# Patient Record
Sex: FEMALE | Race: BLACK OR AFRICAN AMERICAN | Employment: OTHER | URBAN - METROPOLITAN AREA
[De-identification: names, ages, dates, MRNs, and addresses within clinical notes are randomized per-mention and may not be internally consistent; named-entity substitution may affect disease eponyms.]

---

## 2018-03-14 ENCOUNTER — HOSPITAL ENCOUNTER (OUTPATIENT)
Dept: PHYSICAL THERAPY | Age: 63
Discharge: HOME | End: 2018-03-14
Payer: COMMERCIAL

## 2018-03-14 PROCEDURE — 97110 THERAPEUTIC EXERCISES: CPT | Performed by: PHYSICAL THERAPIST

## 2018-03-14 PROCEDURE — 97161 PT EVAL LOW COMPLEX 20 MIN: CPT | Performed by: PHYSICAL THERAPIST

## 2018-03-14 NOTE — PROGRESS NOTES
[facility]  [address] [place], [address] [place]  Ph [phone]  Fx [phone]    Plan of Care/ Statement of Necessity for Physical Therapy Services    Patient name: Leary Schirmer Start of Care: 3/14/2018   Referral source: Jude Lovelace MD : 1955    Medical Diagnosis: left ankle/foot traumatic arthropathy   Onset Date:2017    Treatment Diagnosis: L ankle replacement   Prior Hospitalization: see medical history Provider#: [id number]   Medications: Verified on Patient summary List    Comorbidities: OA, HTN   Prior Level of Function: sedentary, independent w/ADLs      The Plan of Care and following information is based on the information from the initial evaluation. Assessment/ key information: Patient presents s/p L Total ankle arthroplasty on 2017. Patient will continue to benefit from skilled PT services to modify and progress therapeutic interventions, address functional mobility deficits, address ROM deficits, address strength deficits, analyze and address soft tissue restrictions, analyze and cue movement patterns, analyze and modify body mechanics/ergonomics and assess and modify postural abnormalities to attain remaining goals.   Evaluation Complexity History MEDIUM  Complexity : 1-2 comorbidities / personal factors will impact the outcome/ POC ; Examination MEDIUM Complexity : 3 Standardized tests and measures addressing body structure, function, activity limitation and / or participation in recreation  ;Presentation LOW Complexity : Stable, uncomplicated  ;Clinical Decision Making MEDIUM Complexity : FOTO score of 26-74  Overall Complexity Rating: LOW   Problem List: pain affecting function, decrease ROM, decrease strength, edema affecting function, impaired gait/ balance, decrease ADL/ functional abilitiies, decrease activity tolerance, decrease flexibility/ joint mobility and decrease transfer abilities   Treatment Plan may include any combination of the following: Therapeutic exercise, Therapeutic activities, Neuromuscular re-education, Physical agent/modality, Gait/balance training, Manual therapy, Aquatic therapy, Patient education, Functional mobility training and Stair training  Patient / Family readiness to learn indicated by: asking questions, trying to perform skills and interest  Persons(s) to be included in education: patient (P)  Barriers to Learning/Limitations: None  Patient Goal (s): build up strength  Patient Self Reported Health Status: good  Rehabilitation Potential: fair    Short Term Goals: To be accomplished in 2 weeks:   Patient will report compliance with HEP at least 1x/day to aid in rehabilitation program.   Status at IE:na   Current:     Patient will demonstrate AROM of 10 degrees DF to aid in completion of ADLs. Status at IE:0 L DF   Current:       Long Term Goals: To be accomplished in 4 weeks:   Patient will increase strength to 4+/5 throughout B LEs to aid in return recreational activities and ADLs. Status at IE:4-/5 grossly throughout L ankle   Current:     Patient will ambulate 20mins on level surface with no need for rest break and no ankle pain. Status at IE: requires rest breaks after about 10mins   Current:     Patient will improve FOTO to 59 points overall to demonstrate improvement in functional ability. Status at IE: 40   Current:         Frequency / Duration: Patient to be seen 2 times per week for 4 weeks. Patient/ Caregiver education and instruction: Diagnosis, prognosis, self care, activity modification and exercises   [x]  Plan of care has been reviewed with PEREZ Clements PT, DPT 3/14/2018 3:50 PM    ________________________________________________________________________    I certify that the above Therapy Services are being furnished while the patient is under my care. I agree with the treatment plan and certify that this therapy is necessary.     Physician's Signature:____________________ Date:____________Time: _________    Please sign and return to [facility]  [address], [address]  Ph [phone]  Fx [phone]

## 2018-03-14 NOTE — PROGRESS NOTES
PT DAILY TREATMENT NOTE/FOOT AND ANKLE EVAL 3-16    Patient Name: Vince Pelletier  Date:3/14/2018  : 1955  [x]  Patient  Verified  Payor: Roberto Medina / Plan: [place] [employer] [place] / Product Type: PPO /    In time:3:00  Out time:3:50  Total Treatment Time (min): 50  Total Timed Codes (min): 25  1:1 Treatment Time ( [address] only): 50   Visit #: 1 of 8    Treatment Area: left ankle/foot traumatic arthropathy    SUBJECTIVE  Pain Level (0-10 scale): 1  []constant []intermittent []improving []worsening []no change since onset    Any medication changes, allergies to medications, adverse drug reactions, diagnosis change, or new procedure performed?: [x] No    [] Yes (see summary sheet for update)  Subjective functional status/changes:     Patient has CC of L TAA on 2018. MARQUITA: None. Patient describes pain as spasm, occasionally. No diurnal features to pain. . Denies numbness/tingling (reports L dorsalmedial foot numbness)  Denies popping/clicking. Aggravating factors:walking. Alleviating factors: rest.  Denies red flags: SOB, chest pain, dizziness/lightheadedness, blurred/double vision, HA, chills/fevers, night sweats, change in bowel/bladder control, abdominal pain, difficulty swallowing, slurred speech, unexplained weight gain/loss. PMHx: HTN, OA. Surgical Hx: L total ankle replacement 2017. Social Hx: 1 level home (5 KRIS), denies alcohol, tobacco, work status: telemetry. PLOF:  sedentary. CLOF: similar.   Diagnostic Imaging: NA      OBJECTIVE/EXAMINATION    25 min [x]Eval                  []Re-Eval       25 min Therapeutic Exercise:  [x] See flow sheet :   Rationale: increase ROM, increase strength and improve coordination to improve the patients ability to complete ADLs       With   [] TE   [] TA   [] neuro   [] other: Patient Education: [x] Review HEP    [] Progressed/Changed HEP based on:   [] positioning   [] body mechanics   [] transfers   [] heat/ice application    [] other:      Physical Therapy Evaluation  - Foot and Ankle    Gait: [] Normal    [] Abnormal    [x] Antalgic    [] NWB    Device:  Describe:    ROM/Strength  [] Unable to assess at this time      AROM           Strength (1-5)   Left Right Left  Right   Dorsiflexion 0 10 4- 5   Plantarflexion 30 45 2+ 3+   Inversion 20 35 4- 4-   Eversion 5 10 4- 4-   Great Toe Ext 55 70 5 5       Optional Tests:  Single Leg Balance:   (L) Time(sec):  <5 (R) Time(sec): 15 (L)/(R)%:      Other tests/ comments: deferred       Pain Level (0-10 scale) post treatment: 1    ASSESSMENT/Changes in Function: Patient presents s/p L Total ankle arthroplasty on 11/29/2017. Patient will continue to benefit from skilled PT services to modify and progress therapeutic interventions, address functional mobility deficits, address ROM deficits, address strength deficits, analyze and address soft tissue restrictions, analyze and cue movement patterns, analyze and modify body mechanics/ergonomics and assess and modify postural abnormalities to attain remaining goals.      [x]  See Plan of Care  []  See progress note/recertification  []  See Discharge Summary         Progress towards goals / Updated goals:  See POC    PLAN  []  Upgrade activities as tolerated     [x]  Continue plan of care  []  Update interventions per flow sheet       []  Discharge due to:_  []  Other:_      Sean Miller PT, DPT 3/14/2018  3:08 PM

## 2018-03-16 ENCOUNTER — HOSPITAL ENCOUNTER (OUTPATIENT)
Dept: PHYSICAL THERAPY | Age: 63
Discharge: HOME | End: 2018-03-16
Payer: COMMERCIAL

## 2018-03-16 PROCEDURE — 97110 THERAPEUTIC EXERCISES: CPT

## 2018-03-16 PROCEDURE — 97530 THERAPEUTIC ACTIVITIES: CPT

## 2018-03-16 NOTE — PROGRESS NOTES
PT DAILY TREATMENT NOTE     Patient Name: Josefa Prabhakar  Date:3/16/2018  : 1955  [x]  Patient  Verified  Payor: Cyndee Cabrera / Plan: [place] [employer] [place] / Product Type: PPO /    In time:3:35  Out time:4:20  Total Treatment Time (min): 45  Visit #: 2 of 8    Treatment Area: left ankle/foot traumatic arthropathy    SUBJECTIVE  Pain Level (0-10 scale): 0/10  Any medication changes, allergies to medications, adverse drug reactions, diagnosis change, or new procedure performed?: [x] No    [] Yes (see summary sheet for update)  Subjective functional status/changes:   [] No changes reported  \"I don't have any pain right now. I have a lot of stiffness and some pain in the morning but it feels better when I move it. \"    OBJECTIVE      30 min Therapeutic Exercise:  [x] See flow sheet :   Rationale: increase ROM, increase strength and improve coordination to improve the patients ability to perform ADLs with less pain. 15 min Therapeutic Activity:  [x]  See flow sheet :   Rationale: increase ROM, increase strength and improve coordination  to improve the patients ability to perform ADLs with less pain. With   [] TE   [] TA   [] neuro   [] other: Patient Education: [x] Review HEP    [] Progressed/Changed HEP based on:   [] positioning   [] body mechanics   [] transfers   [] heat/ice application    [] other:      Other Objective/Functional Measures:      Pain Level (0-10 scale) post treatment: 0/10    ASSESSMENT/Changes in Function: Pt tolerated therex well. Pt required VC and demonstration for correct squat form and to discourage forward weight shift during. Pt denied modalities post tx.      Patient will continue to benefit from skilled PT services to modify and progress therapeutic interventions, address functional mobility deficits, address ROM deficits, address strength deficits, analyze and address soft tissue restrictions, analyze and cue movement patterns, analyze and modify body mechanics/ergonomics and assess and modify postural abnormalities to attain remaining goals. []  See Plan of Care  []  See progress note/recertification  []  See Discharge Summary         Progress towards goals / Updated goals:  Short Term Goals: To be accomplished in 2 weeks:                        Patient will report compliance with HEP at least 1x/day to aid in rehabilitation program.                        Status at IE:na                        Current: NT                           Patient will demonstrate AROM of 10 degrees DF to aid in completion of ADLs. Status at IE:0 L DF                        Current: NT         Long Term Goals: To be accomplished in 4 weeks:                        Patient will increase strength to 4+/5 throughout B LEs to aid in return recreational activities and ADLs. Status at IE:4-/5 grossly throughout L ankle                        Current: NT                           Patient will ambulate 20mins on level surface with no need for rest break and no ankle pain. Status at IE: requires rest breaks after about 10mins                        Current: NT                           Patient will improve FOTO to 59 points overall to demonstrate improvement in functional ability.                         Status at IE: 40                        Current: NT           PLAN  [x]  Upgrade activities as tolerated     [x]  Continue plan of care  []  Update interventions per flow sheet       []  Discharge due to:_  []  Other:_      Reta Connell 3/16/2018  4:29 PM    Future Appointments  Date Time Provider Deidra Ortiz   3/19/2018 9:00 [address] [facility]   3/21/2018 2:30 PM Willy Tellez PT [facility]   3/26/2018 9:00 AM [facility]note 3[facility]   3/28/2018 9:00 AM [facility][note 3]   4/2/2018 10:00 AM [facility][note 3]   4/4/2018 10:00 AM [facility][note 3]   4/9/2018 10:00 AM [facility][note 3] 4/11/2018 10:00  Adelso Martínez

## 2018-03-19 ENCOUNTER — HOSPITAL ENCOUNTER (OUTPATIENT)
Dept: PHYSICAL THERAPY | Age: 63
Discharge: HOME | End: 2018-03-19
Payer: COMMERCIAL

## 2018-03-19 PROCEDURE — 97530 THERAPEUTIC ACTIVITIES: CPT

## 2018-03-19 PROCEDURE — 97110 THERAPEUTIC EXERCISES: CPT

## 2018-03-19 NOTE — PROGRESS NOTES
PT DAILY TREATMENT NOTE     Patient Name: Josefa Prabhakar  Date:3/19/2018  : 1955  [x]  Patient  Verified  Payor: Cyndee Cbarera / Plan: [place] [employer] / Product Type: PPO /    In time:9:05  Out time:9:45  Total Treatment Time (min): 40  Visit #: 3 of 8    Treatment Area: left ankle/foot traumatic arthropathy    SUBJECTIVE  Pain Level (0-10 scale): 0/10  Any medication changes, allergies to medications, adverse drug reactions, diagnosis change, or new procedure performed?: [x] No    [] Yes (see summary sheet for update)  Subjective functional status/changes:   [] No changes reported  \"I don't really have any pain today. I had some spasms after the last tx but nothing bad. \"    OBJECTIVE      30 min Therapeutic Exercise:  [x] See flow sheet :   Rationale: increase ROM, increase strength and improve coordination to improve the patients ability to perform ADLs with less pain. 10 min Therapeutic Activity:  [x]  See flow sheet :   Rationale: increase ROM, increase strength and improve coordination  to improve the patients ability to perform ADLs with less pain. With   [] TE   [] TA   [] neuro   [] other: Patient Education: [x] Review HEP    [] Progressed/Changed HEP based on:   [] positioning   [] body mechanics   [] transfers   [] heat/ice application    [] other:      Other Objective/Functional Measures:      Pain Level (0-10 scale) post treatment: 0/10    ASSESSMENT/Changes in Function: Pt tolerated therex well. Pt requires manual cues for ankle mobility with therex. Pt tends to compensate ankle movements with hip strategies. Pt denied modalities post tx.      Patient will continue to benefit from skilled PT services to modify and progress therapeutic interventions, address functional mobility deficits, address ROM deficits, address strength deficits, analyze and address soft tissue restrictions, analyze and cue movement patterns, analyze and modify body mechanics/ergonomics and assess and modify postural abnormalities to attain remaining goals. []  See Plan of Care  []  See progress note/recertification  []  See Discharge Summary         Progress towards goals / Updated goals:  Short Term Goals: To be accomplished in 2 weeks:                        ZGMYMJB will report compliance with HEP at least 1x/day to aid in rehabilitation program.                        FGKJJT at IE:na                        Current: Pt reports more amb than therex.                            Patient will demonstrate AROM of 10 degrees DF to aid in completion of ADLs.                       SJZXVW at IE:0 L DF                        Current: NT          Long Term Goals: To be accomplished in 4 weeks:                        UIOBBUY will increase strength to 4+/5 throughout B LEs to aid in return recreational activities and ADLs.                       IKIHWM at IE:4-/5 grossly throughout L ankle                        Current: NT                            Patient will ambulate 20mins on level surface with no need for rest break and no ankle pain.                        Status at IE: requires rest breaks after about 10mins                        Current: NT                            Patient will improve FOTO to 59 points overall to demonstrate improvement in functional ability.                         STOLBL at IE: 40                        Current: NT        PLAN  [x]  Upgrade activities as tolerated     [x]  Continue plan of care  []  Update interventions per flow sheet       []  Discharge due to:_  []  Other:_      Merle Venegas 3/19/2018  9:11 AM    Future Appointments  Date Time Provider Deidra Ortiz   3/21/2018 2:30 PM Raghu Davila PT [facility]   3/26/2018 9:00  [address] [facility]   3/28/2018 9:00 AM [facility]   4/2/2018 10:00 AM Merle Waggonerirn [facility]   [facility]2018 [facility]   [facility]2018 [facility]   4/11/2018 10:00 AM Merle Venegas [facility]

## 2018-03-21 ENCOUNTER — HOSPITAL ENCOUNTER (OUTPATIENT)
Dept: PHYSICAL THERAPY | Age: 63
Discharge: HOME | End: 2018-03-21
Payer: COMMERCIAL

## 2018-03-21 PROCEDURE — 97530 THERAPEUTIC ACTIVITIES: CPT

## 2018-03-21 PROCEDURE — 97110 THERAPEUTIC EXERCISES: CPT

## 2018-03-21 NOTE — PROGRESS NOTES
PT DAILY TREATMENT NOTE     Patient Name: Sarahann Riedel  Date:3/21/2018   : 1955  [x]  Patient  Verified  Payor: Shade Prabhakar / Plan: [place] [employer] [place] / Product Type: PPO /    In time:2:30  Out time:3:30  Total Treatment Time (min): 60  Visit #: 4 of 8    Treatment Area: left ankle/foot traumatic arthropathy    SUBJECTIVE  Pain Level (0-10 scale): 1/10  Any medication changes, allergies to medications, adverse drug reactions, diagnosis change, or new procedure performed?: [x] No    [] Yes (see summary sheet for update)  Subjective functional status/changes:   [] No changes reported  \"It's a little stiff today. \"    OBJECTIVE      40 min Therapeutic Exercise:  [x] See flow sheet :   Rationale: increase ROM, increase strength and improve coordination to improve the patients ability to perform ADLs with less pain. 20 min Therapeutic Activity:  [x]  See flow sheet :   Rationale: increase ROM, increase strength and improve coordination  to improve the patients ability to perform ADLs with less pain. With   [] TE   [] TA   [] neuro   [] other: Patient Education: [x] Review HEP    [] Progressed/Changed HEP based on:   [] positioning   [] body mechanics   [] transfers   [] heat/ice application    [] other:      Other Objective/Functional Measures:      Pain Level (0-10 scale) post treatment: 0/10    ASSESSMENT/Changes in Function: Pt continues to be challenged by therex. Pt reports stiffness in anterior ankle. Pt denied modalities post tx. Patient will continue to benefit from skilled PT services to modify and progress therapeutic interventions, address functional mobility deficits, address ROM deficits, address strength deficits, analyze and address soft tissue restrictions, analyze and cue movement patterns, analyze and modify body mechanics/ergonomics and assess and modify postural abnormalities to attain remaining goals.      []  See Plan of Care  []  See progress note/recertification  []  See Discharge Summary         Progress towards goals / Updated goals:  Short Term Goals: To be accomplished in 2 weeks:                        SMLKSJH will report compliance with HEP at least 1x/day to aid in rehabilitation program.                        ILDSFS at IE:na                        Current: Pt reports compliance 2x/per day                            Patient will demonstrate AROM of 10 degrees DF to aid in completion of ADLs.                       OOBQCM at IE:0 L DF                        Current: DF: 0          Long Term Goals: To be accomplished in 4 weeks:                        CGCFQEQ will increase strength to 4+/5 throughout B LEs to aid in return recreational activities and ADLs.                       IDVUUU at IE:4-/5 grossly throughout L ankle                        Current: NT                            Patient will ambulate 20mins on level surface with no need for rest break and no ankle pain.                        Status at IE: requires rest breaks after about 10mins                        Current: NT                            Patient will improve FOTO to 59 points overall to demonstrate improvement in functional ability.                         HEOMWU at IE: 40                        Current: NT    PLAN  [x]  Upgrade activities as tolerated     [x]  Continue plan of care  []  Update interventions per flow sheet       []  Discharge due to:_  []  Other:_      [facility] 3/21/2018  2:39 PM    Future Appointments  Date Time Provider Deidra Ortiz   3/26/2018 9:00 [address] [facility]   3/28/2018 9:00  [address] [facility]   4/2/2018 10:00 AM [facility]   4/4/2018 10:00 AM [facility]   4/9/2018 10:00 AM [facility]   4/11/2018 10:00 AM [facility]

## 2018-03-26 ENCOUNTER — HOSPITAL ENCOUNTER (OUTPATIENT)
Dept: PHYSICAL THERAPY | Age: 63
Discharge: HOME | End: 2018-03-26
Payer: COMMERCIAL

## 2018-03-26 PROCEDURE — 97530 THERAPEUTIC ACTIVITIES: CPT

## 2018-03-26 PROCEDURE — 97110 THERAPEUTIC EXERCISES: CPT

## 2018-03-26 NOTE — PROGRESS NOTES
PT DAILY TREATMENT NOTE     Patient Name: Doe Ashley  Date:3/26/2018  : 1955  [x]  Patient  Verified  Payor: William Blankenship / Plan: [place] [employer] PPO / Product Type: PPO /    In time:1:30  Out time:2:15  Total Treatment Time (min): 45  Visit #: 5 of 8    Treatment Area: Traumatic arthropathy, left ankle and foot [M12.572]    SUBJECTIVE  Pain Level (0-10 scale): 0/10  Any medication changes, allergies to medications, adverse drug reactions, diagnosis change, or new procedure performed?: [x] No    [] Yes (see summary sheet for update)  Subjective functional status/changes:   [] No changes reported  \"I don't have any pain really right now. \"    OBJECTIVE      45 min Therapeutic Exercise:  [x] See flow sheet :   Rationale: increase ROM, increase strength and improve coordination to improve the patients ability to perform ADLs with less pain. With   [] TE   [] TA   [] neuro   [] other: Patient Education: [x] Review HEP    [] Progressed/Changed HEP based on:   [] positioning   [] body mechanics   [] transfers   [] heat/ice application    [] other:      Other Objective/Functional Measures:      Pain Level (0-10 scale) post treatment: 0/10    ASSESSMENT/Changes in Function: Pt continues to be challenged by therex. Pt very challenged by SLS on Arex as evidence by fatiguing and increased ankle strategies. Pt denied modalities post tx. Patient will continue to benefit from skilled PT services to modify and progress therapeutic interventions, address functional mobility deficits, address ROM deficits, address strength deficits, analyze and address soft tissue restrictions, analyze and cue movement patterns, analyze and modify body mechanics/ergonomics and assess and modify postural abnormalities to attain remaining goals.      []  See Plan of Care  []  See progress note/recertification  []  See Discharge Summary         Progress towards goals / Updated goals:  Short Term Goals: To be accomplished in 2 weeks:                        TXGOWXI will report compliance with HEP at least 1x/day to aid in rehabilitation program.                        BURYXW at IE:na                        Current: Pt reports compliance 2x/per day                            Patient will demonstrate AROM of 10 degrees DF to aid in completion of ADLs.                       WZFRCI at IE:0 L DF                        Current: DF: 0          Long Term Goals: To be accomplished in 4 weeks:                        WBVKZSZ will increase strength to 4+/5 throughout B LEs to aid in return recreational activities and ADLs.                       RCCYJS at IE:4-/5 grossly throughout L ankle                        Current: NT                            Patient will ambulate 20mins on level surface with no need for rest break and no ankle pain.                        Status at IE: requires rest breaks after about 10mins                        Current: NT                            Patient will improve FOTO to 59 points overall to demonstrate improvement in functional ability.                         RKPGUA at IE: 40                        Current: NT       PLAN  [x]  Upgrade activities as tolerated     [x]  Continue plan of care  []  Update interventions per flow sheet       []  Discharge due to:_  []  Other:_      Natasha [facility] 3/26/2018  1:43 PM    Future Appointments  Date Time Provider Deidra Ortiz   3/28/2018 9:00 AM [facility]   4/2/2018 10:00  [address] [facility]   4/4/2018 10:00 AM [facility]   4/9/2018 10:00 AM [facility]   4/11/2018 10:00 AM [facility]

## 2018-03-28 ENCOUNTER — HOSPITAL ENCOUNTER (OUTPATIENT)
Dept: PHYSICAL THERAPY | Age: 63
Discharge: HOME | End: 2018-03-28
Payer: COMMERCIAL

## 2018-03-28 PROCEDURE — 97110 THERAPEUTIC EXERCISES: CPT

## 2018-03-28 PROCEDURE — 97530 THERAPEUTIC ACTIVITIES: CPT

## 2018-03-28 NOTE — PROGRESS NOTES
PT DAILY TREATMENT NOTE     Patient Name: Marleni Sanchez  Date:3/28/2018  : 1955  [x]  Patient  Verified  Payor: Germania Dyson / Plan: [place] [employer] / Product Type: PPO /    In time:9:08  Out time:10:00  Total Treatment Time (min): 52  Visit #: 6 of 8    Treatment Area: left ankle/foot traumatic arthropathy    SUBJECTIVE  Pain Level (0-10 scale): 0/10  Any medication changes, allergies to medications, adverse drug reactions, diagnosis change, or new procedure performed?: [x] No    [] Yes (see summary sheet for update)  Subjective functional status/changes:   [] No changes reported  \"I don't have any pain right now. I had some spasms yesterday but I [place] walking around in the mall a lot though. \"    OBJECTIVE      32 min Therapeutic Exercise:  [x] See flow sheet :   Rationale: increase ROM, increase strength and improve coordination to improve the patients ability to return to prior level of physical activity. 20 min Therapeutic Activity:  [x]  See flow sheet :   Rationale: increase ROM, increase strength and improve coordination  to improve the patients ability to perform ADLs with less pain. With   [] TE   [] TA   [] neuro   [] other: Patient Education: [x] Review HEP    [] Progressed/Changed HEP based on:   [] positioning   [] body mechanics   [] transfers   [] heat/ice application    [] other:      Other Objective/Functional Measures:      Pain Level (0-10 scale) post treatment: 0/10     ASSESSMENT/Changes in Function: Pt continues to be challenged by ankle AROM therex. Pt show great difficulty with BAPS board rotations, unable to maintain multiple repetions without compensating to knee strategies.     Patient will continue to benefit from skilled PT services to modify and progress therapeutic interventions, address functional mobility deficits, address ROM deficits, address strength deficits, analyze and address soft tissue restrictions, analyze and cue movement patterns, analyze and modify body mechanics/ergonomics and assess and modify postural abnormalities to attain remaining goals. []  See Plan of Care  []  See progress note/recertification  []  See Discharge Summary         Progress towards goals / Updated goals:  Short Term Goals: To be accomplished in 2 weeks:                        STNFLAL will report compliance with HEP at least 1x/day to aid in rehabilitation program.                        HAXBML at IE:na                        Current: Pt reports compliance 2x/per day                            Patient will demonstrate AROM of 10 degrees DF to aid in completion of ADLs.                       QYEJBB at IE:0 L DF                        Current: DF: 0          Long Term Goals: To be accomplished in 4 weeks:                        YHYAYVG will increase strength to 4+/5 throughout B LEs to aid in return recreational activities and ADLs.                       EMBIXI at IE:4-/5 grossly throughout L ankle                        Current: NT                            Patient will ambulate 20mins on level surface with no need for rest break and no ankle pain.                        Status at IE: requires rest breaks after about 10mins                        Current: NT                            Patient will improve FOTO to 59 points overall to demonstrate improvement in functional ability.                         QGWCTU at IE: 40                        Current: NT    PLAN  [x]  Upgrade activities as tolerated     [x]  Continue plan of care  []  Update interventions per flow sheet       []  Discharge due to:_  []  Other:_      Deyanira White 3/28/2018  9:18 AM    Future Appointments  Date Time Provider Deidra Ortiz   4/5/2018 2:00 PM Rosalind Moore, PT [facility]   4/6/2018 12:30 PM Rosalind Moore PT [facility]   [facility]   4/11/2018 10:00 AM [facility]

## 2018-04-02 ENCOUNTER — APPOINTMENT (OUTPATIENT)
Dept: PHYSICAL THERAPY | Age: 63
End: 2018-04-02
Payer: COMMERCIAL

## 2018-04-04 ENCOUNTER — APPOINTMENT (OUTPATIENT)
Dept: PHYSICAL THERAPY | Age: 63
End: 2018-04-04
Payer: COMMERCIAL

## 2018-04-05 ENCOUNTER — HOSPITAL ENCOUNTER (OUTPATIENT)
Dept: PHYSICAL THERAPY | Age: 63
Discharge: HOME | End: 2018-04-05
Payer: COMMERCIAL

## 2018-04-05 PROCEDURE — 97530 THERAPEUTIC ACTIVITIES: CPT | Performed by: PHYSICAL THERAPIST

## 2018-04-05 PROCEDURE — 97110 THERAPEUTIC EXERCISES: CPT | Performed by: PHYSICAL THERAPIST

## 2018-04-05 NOTE — PROGRESS NOTES
[facility]  [address] [place], [address]  Ph [phone]  Fx [phone]    Physical Therapy Discharge Summary    Patient name: Naomi Tyson Start of Care: 3/14/2018   Referral source: Omar Rod MD : 1955   Medical/Treatment Diagnosis: left ankle/foot traumatic arthropathy Onset Date:2017     Prior Hospitalization: see medical history Provider#: [id number]   Medications: Verified on Patient Summary List    Comorbidities: OA, HTN  Prior Level of Function:sedentary, independent w/ ADLs    Visits from Start of Care: 7    Missed Visits: 0    Reporting Period : 3/14/2018 to 2018    Summary of Care:  Short Term Goals: To be accomplished in 2 weeks:                        Patient will report compliance with HEP at least 1x/day to aid in rehabilitation program.                        WTEIZQ at IE:na                        Current: Pt reports compliance 2x/per day Met 2018                            Patient will demonstrate AROM of 10 degrees DF to aid in completion of ADLs.                       LOTXMU at IE:0 L DF                        Current: DF: 0-5 Not Met 2018          Long Term Goals: To be accomplished in 4 weeks:                        ZMRJTRK will increase strength to 4+/5 throughout B LEs to aid in return recreational activities and ADLs.                       GOLJRA at IE:4-/5 grossly throughout L ankle                        Current: 5/5 met Met 2018                            Patient will ambulate 20mins on level surface with no need for rest break and no ankle pain.                        Status at IE: requires rest breaks after about 10mins                        Current: 30' Met 2018                            Patient will improve FOTO to 59 points overall to demonstrate improvement in functional ability.                         KERA at IE: 40                        Current: 58 Met 4/5/2018    ASSESSMENT/RECOMMENDATIONS:  Patient has made good progress to date, meeting most goals Is capable of being discharged to independent [facility] at this time    [x]Discontinue therapy: [x]Patient has reached or is progressing toward set goals      []Patient is non-compliant or has abdicated      []Due to lack of appreciable progress towards set goals    Jhoan Melgoza, PT, DPT 4/5/2018 5:00 PM

## 2018-04-05 NOTE — PROGRESS NOTES
PT DAILY TREATMENT NOTE     Patient Name: Stiven Whipple  Date:2018  : 1955  [x]  Patient  Verified  Payor: Rob Mims / Plan: [place] [employer] / Product Type: PPO /    In time:2:05  Out time:2:50  Total Treatment Time (min): 45  Visit #: 7 of 8    Treatment Area: left ankle/foot traumatic arthropathy    SUBJECTIVE  Pain Level (0-10 scale): 0  Any medication changes, allergies to medications, adverse drug reactions, diagnosis change, or new procedure performed?: [x] No    [] Yes (see summary sheet for update)  Subjective functional status/changes:   [] No changes reported  Feels good. Ready to be discharged    OBJECTIVE    35 min Therapeutic Exercise:  [x] See flow sheet :   Rationale: increase ROM, increase strength and decrease pain to improve the patients ability to complete ADLs    10 min Therapeutic Activity:  [x]  See flow sheet :   Rationale: increase ROM, increase strength and improve coordination  to improve the patients ability to complete ADLs         With   [] TE   [] TA   [] neuro   [] other: Patient Education: [x] Review HEP    [] Progressed/Changed HEP based on:   [] positioning   [] body mechanics   [] transfers   [] heat/ice application    [] other:        Pain Level (0-10 scale) post treatment: 0    ASSESSMENT/Changes in Function: Patient responds well to treatment session. Patient has made good progress to date, meeting most goals Is capable of being discharged to independent HEP at this time.  No adverse effects were noted from today's treatment session        []  See Plan of Care  []  See progress note/recertification  [x]  See Discharge Summary         Progress towards goals / Updated goals:  Short Term Goals: To be accomplished in 2 weeks:                        XDBQCVZ will report compliance with HEP at least 1x/day to aid in rehabilitation program.                        HCPZIK at IE:na                        Current: Pt reports compliance 2x/per day Met 4/5/2018                            Patient will demonstrate AROM of 10 degrees DF to aid in completion of ADLs.                       GMZNVV at IE:0 L DF                        Current: DF: 0-5 Not Met 4/5/2018          Long Term Goals: To be accomplished in 4 weeks:                        ICJSUMJ will increase strength to 4+/5 throughout B LEs to aid in return recreational activities and ADLs.                       IBBERA at IE:4-/5 grossly throughout L ankle                        Current: 5/5 met Met 4/5/2018                            Patient will ambulate 20mins on level surface with no need for rest break and no ankle pain.                        Status at IE: requires rest breaks after about 10mins                        Current: 30' Met 4/5/2018                            Patient will improve FOTO to 59 points overall to demonstrate improvement in functional ability.                         YMUEJH at IE: 40                        Current: 58 Met 4/5/2018    PLAN  []  Upgrade activities as tolerated     []  Continue plan of care  []  Update interventions per flow sheet       [x]  Discharge due to:_ Meeting goals  []  Other:_      Jhoanjethro Melgoza PT, DPT 4/5/2018  2:19 PM    Future Appointments  Date Time Provider Deidra rOtiz   4/6/2018 12:30 PM [facility]   4/9/2018 10:00 AM [facility]   4/11/2018 10:00 AM [facility]

## 2018-04-06 ENCOUNTER — APPOINTMENT (OUTPATIENT)
Dept: PHYSICAL THERAPY | Age: 63
End: 2018-04-06
Payer: COMMERCIAL

## 2018-04-09 ENCOUNTER — APPOINTMENT (OUTPATIENT)
Dept: PHYSICAL THERAPY | Age: 63
End: 2018-04-09
Payer: COMMERCIAL

## 2018-04-11 ENCOUNTER — APPOINTMENT (OUTPATIENT)
Dept: PHYSICAL THERAPY | Age: 63
End: 2018-04-11
Payer: COMMERCIAL

## 2021-01-06 ENCOUNTER — HOSPITAL ENCOUNTER (OUTPATIENT)
Dept: PREADMISSION TESTING | Age: 66
Discharge: HOME | End: 2021-01-06
Payer: MEDICARE

## 2021-01-06 LAB
ALBUMIN SERPL-MCNC: 3.6 G/DL (ref 3.4–5)
ALBUMIN/GLOB SERPL: 0.9 {RATIO} (ref 0.8–1.7)
ALP SERPL-CCNC: 110 U/L (ref 45–117)
ALT SERPL-CCNC: 22 U/L (ref 13–56)
ANION GAP SERPL CALC-SCNC: 5 MMOL/L (ref 3–18)
APTT PPP: 31.4 SEC (ref 23–36.4)
AST SERPL-CCNC: 10 U/L (ref 10–38)
BASOPHILS # BLD: 0 K/UL (ref 0–0.1)
BASOPHILS NFR BLD: 0 % (ref 0–2)
BILIRUB SERPL-MCNC: 0.3 MG/DL (ref 0.2–1)
BUN SERPL-MCNC: 15 MG/DL (ref 7–18)
BUN/CREAT SERPL: 17 (ref 12–20)
CALCIUM SERPL-MCNC: 9.2 MG/DL (ref 8.5–10.1)
CHLORIDE SERPL-SCNC: 100 MMOL/L (ref 100–111)
CO2 SERPL-SCNC: 32 MMOL/L (ref 21–32)
CREAT SERPL-MCNC: 0.89 MG/DL (ref 0.6–1.3)
DIFFERENTIAL METHOD BLD: NORMAL
EOSINOPHIL # BLD: 0.1 K/UL (ref 0–0.4)
EOSINOPHIL NFR BLD: 1 % (ref 0–5)
ERYTHROCYTE [DISTWIDTH] IN BLOOD BY AUTOMATED COUNT: 14.2 % (ref 11.6–14.5)
ERYTHROCYTE [SEDIMENTATION RATE] IN BLOOD: 57 MM/HR (ref 0–30)
GLOBULIN SER CALC-MCNC: 4 G/DL (ref 2–4)
GLUCOSE SERPL-MCNC: 237 MG/DL (ref 74–99)
HCT VFR BLD AUTO: 38.7 % (ref 35–45)
HGB BLD-MCNC: 13 G/DL (ref 12–16)
INR PPP: 0.9 (ref 0.8–1.2)
LYMPHOCYTES # BLD: 2.2 K/UL (ref 0.9–3.6)
LYMPHOCYTES NFR BLD: 26 % (ref 21–52)
MCH RBC QN AUTO: 30.4 PG (ref 24–34)
MCHC RBC AUTO-ENTMCNC: 33.6 G/DL (ref 31–37)
MCV RBC AUTO: 90.4 FL (ref 74–97)
MONOCYTES # BLD: 0.5 K/UL (ref 0.05–1.2)
MONOCYTES NFR BLD: 6 % (ref 3–10)
NEUTS SEG # BLD: 5.7 K/UL (ref 1.8–8)
NEUTS SEG NFR BLD: 67 % (ref 40–73)
PLATELET # BLD AUTO: 245 K/UL (ref 135–420)
PMV BLD AUTO: 10.2 FL (ref 9.2–11.8)
POTASSIUM SERPL-SCNC: 3.9 MMOL/L (ref 3.5–5.5)
PROT SERPL-MCNC: 7.6 G/DL (ref 6.4–8.2)
PROTHROMBIN TIME: 12.3 SEC (ref 11.5–15.2)
RBC # BLD AUTO: 4.28 M/UL (ref 4.2–5.3)
SODIUM SERPL-SCNC: 137 MMOL/L (ref 136–145)
WBC # BLD AUTO: 8.4 K/UL (ref 4.6–13.2)

## 2021-01-06 PROCEDURE — 85730 THROMBOPLASTIN TIME PARTIAL: CPT

## 2021-01-06 PROCEDURE — 85025 COMPLETE CBC W/AUTO DIFF WBC: CPT

## 2021-01-06 PROCEDURE — 86140 C-REACTIVE PROTEIN: CPT

## 2021-01-06 PROCEDURE — 85610 PROTHROMBIN TIME: CPT

## 2021-01-06 PROCEDURE — 36415 COLL VENOUS BLD VENIPUNCTURE: CPT

## 2021-01-06 PROCEDURE — 80053 COMPREHEN METABOLIC PANEL: CPT

## 2021-01-06 PROCEDURE — 85652 RBC SED RATE AUTOMATED: CPT

## 2021-01-07 ENCOUNTER — ANESTHESIA (OUTPATIENT)
Dept: SURGERY | Age: 66
End: 2021-01-07
Payer: MEDICARE

## 2021-01-07 ENCOUNTER — ANESTHESIA EVENT (OUTPATIENT)
Dept: SURGERY | Age: 66
End: 2021-01-07
Payer: MEDICARE

## 2021-01-07 ENCOUNTER — HOSPITAL ENCOUNTER (OUTPATIENT)
Age: 66
Setting detail: OBSERVATION
Discharge: HOME | End: 2021-01-08
Attending: ORTHOPAEDIC SURGERY | Admitting: ORTHOPAEDIC SURGERY
Payer: MEDICARE

## 2021-01-07 PROBLEM — L03.011 FELON OF FINGER OF RIGHT HAND: Status: ACTIVE | Noted: 2021-01-07

## 2021-01-07 PROBLEM — L02.511 ABSCESS OF DORSUM OF HAND, RIGHT: Status: ACTIVE | Noted: 2021-01-07

## 2021-01-07 LAB
ATRIAL RATE: 72 BPM
CALCULATED P AXIS, ECG09: 8 DEGREES
CALCULATED R AXIS, ECG10: -22 DEGREES
CALCULATED T AXIS, ECG11: 62 DEGREES
CRP SERPL-MCNC: 10.6 MG/DL (ref 0–0.3)
DIAGNOSIS, 93000: NORMAL
EST. AVERAGE GLUCOSE BLD GHB EST-MCNC: 171 MG/DL
GLUCOSE BLD STRIP.AUTO-MCNC: 112 MG/DL (ref 70–110)
GLUCOSE BLD STRIP.AUTO-MCNC: 150 MG/DL (ref 70–110)
GLUCOSE BLD STRIP.AUTO-MCNC: 166 MG/DL (ref 70–110)
HBA1C MFR BLD: 7.6 % (ref 4.2–5.6)
P-R INTERVAL, ECG05: 154 MS
Q-T INTERVAL, ECG07: 398 MS
QRS DURATION, ECG06: 82 MS
QTC CALCULATION (BEZET), ECG08: 435 MS
VENTRICULAR RATE, ECG03: 72 BPM

## 2021-01-07 PROCEDURE — 74011000250 HC RX REV CODE- 250: Performed by: INTERNAL MEDICINE

## 2021-01-07 PROCEDURE — 99218 HC RM OBSERVATION: CPT

## 2021-01-07 PROCEDURE — 74011000250 HC RX REV CODE- 250: Performed by: ORTHOPAEDIC SURGERY

## 2021-01-07 PROCEDURE — 83036 HEMOGLOBIN GLYCOSYLATED A1C: CPT

## 2021-01-07 PROCEDURE — 76010000138 HC OR TIME 0.5 TO 1 HR: Performed by: ORTHOPAEDIC SURGERY

## 2021-01-07 PROCEDURE — 87077 CULTURE AEROBIC IDENTIFY: CPT

## 2021-01-07 PROCEDURE — 82962 GLUCOSE BLOOD TEST: CPT

## 2021-01-07 PROCEDURE — 87147 CULTURE TYPE IMMUNOLOGIC: CPT

## 2021-01-07 PROCEDURE — 76210000006 HC OR PH I REC 0.5 TO 1 HR: Performed by: ORTHOPAEDIC SURGERY

## 2021-01-07 PROCEDURE — 2709999900 HC NON-CHARGEABLE SUPPLY: Performed by: ORTHOPAEDIC SURGERY

## 2021-01-07 PROCEDURE — 74011250636 HC RX REV CODE- 250/636: Performed by: NURSE ANESTHETIST, CERTIFIED REGISTERED

## 2021-01-07 PROCEDURE — 87070 CULTURE OTHR SPECIMN AEROBIC: CPT

## 2021-01-07 PROCEDURE — 74011250636 HC RX REV CODE- 250/636: Performed by: ANESTHESIOLOGY

## 2021-01-07 PROCEDURE — 87185 SC STD ENZYME DETCJ PER NZM: CPT

## 2021-01-07 PROCEDURE — 77030040361 HC SLV COMPR DVT MDII -B: Performed by: ORTHOPAEDIC SURGERY

## 2021-01-07 PROCEDURE — 87186 SC STD MICRODIL/AGAR DIL: CPT

## 2021-01-07 PROCEDURE — 87205 SMEAR GRAM STAIN: CPT

## 2021-01-07 PROCEDURE — 77030020782 HC GWN BAIR PAWS FLX 3M -B: Performed by: ORTHOPAEDIC SURGERY

## 2021-01-07 PROCEDURE — 74011000250 HC RX REV CODE- 250: Performed by: NURSE ANESTHETIST, CERTIFIED REGISTERED

## 2021-01-07 PROCEDURE — 74011250637 HC RX REV CODE- 250/637: Performed by: ORTHOPAEDIC SURGERY

## 2021-01-07 PROCEDURE — 76060000032 HC ANESTHESIA 0.5 TO 1 HR: Performed by: ORTHOPAEDIC SURGERY

## 2021-01-07 PROCEDURE — 74011250636 HC RX REV CODE- 250/636: Performed by: INTERNAL MEDICINE

## 2021-01-07 PROCEDURE — 93005 ELECTROCARDIOGRAM TRACING: CPT

## 2021-01-07 PROCEDURE — 74011636637 HC RX REV CODE- 636/637: Performed by: ANESTHESIOLOGY

## 2021-01-07 PROCEDURE — 74011636637 HC RX REV CODE- 636/637: Performed by: ORTHOPAEDIC SURGERY

## 2021-01-07 PROCEDURE — 87075 CULTR BACTERIA EXCEPT BLOOD: CPT

## 2021-01-07 PROCEDURE — 36415 COLL VENOUS BLD VENIPUNCTURE: CPT

## 2021-01-07 PROCEDURE — 74011250636 HC RX REV CODE- 250/636: Performed by: ORTHOPAEDIC SURGERY

## 2021-01-07 PROCEDURE — 77030019895 HC PCKNG STRP IODO -A: Performed by: ORTHOPAEDIC SURGERY

## 2021-01-07 RX ORDER — INSULIN LISPRO 100 [IU]/ML
INJECTION, SOLUTION INTRAVENOUS; SUBCUTANEOUS
Status: DISCONTINUED | OUTPATIENT
Start: 2021-01-07 | End: 2021-01-08 | Stop reason: HOSPADM

## 2021-01-07 RX ORDER — LIDOCAINE HYDROCHLORIDE 20 MG/ML
INJECTION, SOLUTION EPIDURAL; INFILTRATION; INTRACAUDAL; PERINEURAL AS NEEDED
Status: DISCONTINUED | OUTPATIENT
Start: 2021-01-07 | End: 2021-01-07 | Stop reason: HOSPADM

## 2021-01-07 RX ORDER — ASPIRIN 81 MG/1
81 TABLET ORAL DAILY
COMMUNITY

## 2021-01-07 RX ORDER — LISINOPRIL 20 MG/1
20 TABLET ORAL DAILY
Status: DISCONTINUED | OUTPATIENT
Start: 2021-01-08 | End: 2021-01-08 | Stop reason: HOSPADM

## 2021-01-07 RX ORDER — SODIUM CHLORIDE, SODIUM LACTATE, POTASSIUM CHLORIDE, CALCIUM CHLORIDE 600; 310; 30; 20 MG/100ML; MG/100ML; MG/100ML; MG/100ML
100 INJECTION, SOLUTION INTRAVENOUS CONTINUOUS
Status: DISCONTINUED | OUTPATIENT
Start: 2021-01-07 | End: 2021-01-07 | Stop reason: HOSPADM

## 2021-01-07 RX ORDER — KETOROLAC TROMETHAMINE 15 MG/ML
15 INJECTION, SOLUTION INTRAMUSCULAR; INTRAVENOUS
Status: DISCONTINUED | OUTPATIENT
Start: 2021-01-07 | End: 2021-01-08 | Stop reason: HOSPADM

## 2021-01-07 RX ORDER — ASPIRIN 81 MG/1
81 TABLET ORAL DAILY
Status: DISCONTINUED | OUTPATIENT
Start: 2021-01-08 | End: 2021-01-08 | Stop reason: HOSPADM

## 2021-01-07 RX ORDER — LISINOPRIL 20 MG/1
20 TABLET ORAL EVERY EVENING
COMMUNITY

## 2021-01-07 RX ORDER — CETIRIZINE HYDROCHLORIDE 10 MG/1
1 CAPSULE, LIQUID FILLED ORAL DAILY
COMMUNITY

## 2021-01-07 RX ORDER — SODIUM CHLORIDE 0.9 % (FLUSH) 0.9 %
5-40 SYRINGE (ML) INJECTION AS NEEDED
Status: DISCONTINUED | OUTPATIENT
Start: 2021-01-07 | End: 2021-01-08 | Stop reason: HOSPADM

## 2021-01-07 RX ORDER — DOCUSATE SODIUM 100 MG/1
100 CAPSULE, LIQUID FILLED ORAL DAILY
COMMUNITY

## 2021-01-07 RX ORDER — HYDROMORPHONE HYDROCHLORIDE 1 MG/ML
1 INJECTION, SOLUTION INTRAMUSCULAR; INTRAVENOUS; SUBCUTANEOUS
Status: DISCONTINUED | OUTPATIENT
Start: 2021-01-07 | End: 2021-01-08 | Stop reason: HOSPADM

## 2021-01-07 RX ORDER — ACETAMINOPHEN 325 MG/1
650 TABLET ORAL EVERY 6 HOURS
Status: DISCONTINUED | OUTPATIENT
Start: 2021-01-07 | End: 2021-01-08 | Stop reason: HOSPADM

## 2021-01-07 RX ORDER — ASCORBIC ACID 500 MG
500 TABLET ORAL DAILY
COMMUNITY

## 2021-01-07 RX ORDER — FLUMAZENIL 0.1 MG/ML
0.2 INJECTION INTRAVENOUS
Status: DISCONTINUED | OUTPATIENT
Start: 2021-01-07 | End: 2021-01-07 | Stop reason: HOSPADM

## 2021-01-07 RX ORDER — LANOLIN ALCOHOL/MO/W.PET/CERES
1000 CREAM (GRAM) TOPICAL DAILY
COMMUNITY

## 2021-01-07 RX ORDER — PRAVASTATIN SODIUM 40 MG/1
40 TABLET ORAL
COMMUNITY

## 2021-01-07 RX ORDER — BUPIVACAINE HYDROCHLORIDE 5 MG/ML
INJECTION, SOLUTION EPIDURAL; INTRACAUDAL AS NEEDED
Status: DISCONTINUED | OUTPATIENT
Start: 2021-01-07 | End: 2021-01-07 | Stop reason: HOSPADM

## 2021-01-07 RX ORDER — SODIUM CHLORIDE, SODIUM LACTATE, POTASSIUM CHLORIDE, CALCIUM CHLORIDE 600; 310; 30; 20 MG/100ML; MG/100ML; MG/100ML; MG/100ML
75 INJECTION, SOLUTION INTRAVENOUS CONTINUOUS
Status: DISCONTINUED | OUTPATIENT
Start: 2021-01-07 | End: 2021-01-08 | Stop reason: HOSPADM

## 2021-01-07 RX ORDER — FACIAL-BODY WIPES
10 EACH TOPICAL DAILY PRN
Status: DISCONTINUED | OUTPATIENT
Start: 2021-01-07 | End: 2021-01-08 | Stop reason: HOSPADM

## 2021-01-07 RX ORDER — ACETAMINOPHEN 500 MG
TABLET ORAL
COMMUNITY

## 2021-01-07 RX ORDER — ONDANSETRON 2 MG/ML
4 INJECTION INTRAMUSCULAR; INTRAVENOUS
Status: DISCONTINUED | OUTPATIENT
Start: 2021-01-07 | End: 2021-01-08 | Stop reason: HOSPADM

## 2021-01-07 RX ORDER — SODIUM CHLORIDE 0.9 % (FLUSH) 0.9 %
5-40 SYRINGE (ML) INJECTION EVERY 8 HOURS
Status: DISCONTINUED | OUTPATIENT
Start: 2021-01-07 | End: 2021-01-07 | Stop reason: HOSPADM

## 2021-01-07 RX ORDER — CEFAZOLIN SODIUM/WATER 2 G/20 ML
2 SYRINGE (ML) INTRAVENOUS EVERY 8 HOURS
Status: DISCONTINUED | OUTPATIENT
Start: 2021-01-07 | End: 2021-01-08

## 2021-01-07 RX ORDER — CEPHALEXIN 500 MG/1
500 CAPSULE ORAL 4 TIMES DAILY
COMMUNITY
End: 2021-01-08

## 2021-01-07 RX ORDER — ONDANSETRON 2 MG/ML
INJECTION INTRAMUSCULAR; INTRAVENOUS AS NEEDED
Status: DISCONTINUED | OUTPATIENT
Start: 2021-01-07 | End: 2021-01-07 | Stop reason: HOSPADM

## 2021-01-07 RX ORDER — ACETAMINOPHEN 325 MG/1
650 TABLET ORAL
Status: DISCONTINUED | OUTPATIENT
Start: 2021-01-07 | End: 2021-01-08 | Stop reason: HOSPADM

## 2021-01-07 RX ORDER — VANCOMYCIN HYDROCHLORIDE
1250 ONCE
Status: COMPLETED | OUTPATIENT
Start: 2021-01-07 | End: 2021-01-07

## 2021-01-07 RX ORDER — VANCOMYCIN HYDROCHLORIDE
1250 EVERY 12 HOURS
Status: DISCONTINUED | OUTPATIENT
Start: 2021-01-08 | End: 2021-01-08

## 2021-01-07 RX ORDER — SODIUM CHLORIDE 0.9 % (FLUSH) 0.9 %
5-40 SYRINGE (ML) INJECTION AS NEEDED
Status: DISCONTINUED | OUTPATIENT
Start: 2021-01-07 | End: 2021-01-07 | Stop reason: HOSPADM

## 2021-01-07 RX ORDER — HYDROMORPHONE HYDROCHLORIDE 2 MG/1
2 TABLET ORAL
Status: DISCONTINUED | OUTPATIENT
Start: 2021-01-07 | End: 2021-01-08 | Stop reason: HOSPADM

## 2021-01-07 RX ORDER — MAGNESIUM SULFATE 100 %
4 CRYSTALS MISCELLANEOUS AS NEEDED
Status: DISCONTINUED | OUTPATIENT
Start: 2021-01-07 | End: 2021-01-08 | Stop reason: HOSPADM

## 2021-01-07 RX ORDER — TRAMADOL HYDROCHLORIDE 50 MG/1
50 TABLET ORAL
COMMUNITY

## 2021-01-07 RX ORDER — ACETAMINOPHEN 500 MG
2000 TABLET ORAL 2 TIMES DAILY
COMMUNITY

## 2021-01-07 RX ORDER — PROCHLORPERAZINE EDISYLATE 5 MG/ML
5 INJECTION INTRAMUSCULAR; INTRAVENOUS
Status: DISCONTINUED | OUTPATIENT
Start: 2021-01-07 | End: 2021-01-07 | Stop reason: HOSPADM

## 2021-01-07 RX ORDER — DEXTROSE MONOHYDRATE 100 MG/ML
125-250 INJECTION, SOLUTION INTRAVENOUS AS NEEDED
Status: DISCONTINUED | OUTPATIENT
Start: 2021-01-07 | End: 2021-01-08 | Stop reason: HOSPADM

## 2021-01-07 RX ORDER — NALOXONE HYDROCHLORIDE 0.4 MG/ML
0.4 INJECTION, SOLUTION INTRAMUSCULAR; INTRAVENOUS; SUBCUTANEOUS AS NEEDED
Status: DISCONTINUED | OUTPATIENT
Start: 2021-01-07 | End: 2021-01-08 | Stop reason: HOSPADM

## 2021-01-07 RX ORDER — INSULIN LISPRO 100 [IU]/ML
INJECTION, SOLUTION INTRAVENOUS; SUBCUTANEOUS ONCE
Status: COMPLETED | OUTPATIENT
Start: 2021-01-07 | End: 2021-01-07

## 2021-01-07 RX ORDER — FENTANYL CITRATE 50 UG/ML
25 INJECTION, SOLUTION INTRAMUSCULAR; INTRAVENOUS AS NEEDED
Status: DISCONTINUED | OUTPATIENT
Start: 2021-01-07 | End: 2021-01-07 | Stop reason: HOSPADM

## 2021-01-07 RX ORDER — NALOXONE HYDROCHLORIDE 0.4 MG/ML
0.1 INJECTION, SOLUTION INTRAMUSCULAR; INTRAVENOUS; SUBCUTANEOUS AS NEEDED
Status: DISCONTINUED | OUTPATIENT
Start: 2021-01-07 | End: 2021-01-07 | Stop reason: HOSPADM

## 2021-01-07 RX ORDER — DIPHENHYDRAMINE HYDROCHLORIDE 50 MG/ML
12.5 INJECTION, SOLUTION INTRAMUSCULAR; INTRAVENOUS
Status: DISCONTINUED | OUTPATIENT
Start: 2021-01-07 | End: 2021-01-07 | Stop reason: HOSPADM

## 2021-01-07 RX ORDER — MIDAZOLAM HYDROCHLORIDE 1 MG/ML
INJECTION, SOLUTION INTRAMUSCULAR; INTRAVENOUS
Status: DISPENSED
Start: 2021-01-07 | End: 2021-01-08

## 2021-01-07 RX ORDER — CYCLOBENZAPRINE HCL 10 MG
10 TABLET ORAL
COMMUNITY

## 2021-01-07 RX ORDER — OXYCODONE HYDROCHLORIDE 5 MG/1
5 TABLET ORAL
Status: DISCONTINUED | OUTPATIENT
Start: 2021-01-07 | End: 2021-01-07 | Stop reason: HOSPADM

## 2021-01-07 RX ORDER — AMOXICILLIN 250 MG
1 CAPSULE ORAL 2 TIMES DAILY
Status: DISCONTINUED | OUTPATIENT
Start: 2021-01-07 | End: 2021-01-08 | Stop reason: HOSPADM

## 2021-01-07 RX ORDER — CYCLOBENZAPRINE HCL 10 MG
10 TABLET ORAL
Status: DISCONTINUED | OUTPATIENT
Start: 2021-01-07 | End: 2021-01-08 | Stop reason: HOSPADM

## 2021-01-07 RX ORDER — SODIUM CHLORIDE, SODIUM LACTATE, POTASSIUM CHLORIDE, CALCIUM CHLORIDE 600; 310; 30; 20 MG/100ML; MG/100ML; MG/100ML; MG/100ML
125 INJECTION, SOLUTION INTRAVENOUS CONTINUOUS
Status: DISCONTINUED | OUTPATIENT
Start: 2021-01-07 | End: 2021-01-07

## 2021-01-07 RX ORDER — SODIUM CHLORIDE 0.9 % (FLUSH) 0.9 %
5-40 SYRINGE (ML) INJECTION EVERY 8 HOURS
Status: DISCONTINUED | OUTPATIENT
Start: 2021-01-07 | End: 2021-01-08 | Stop reason: HOSPADM

## 2021-01-07 RX ORDER — MIDAZOLAM HYDROCHLORIDE 1 MG/ML
INJECTION, SOLUTION INTRAMUSCULAR; INTRAVENOUS AS NEEDED
Status: DISCONTINUED | OUTPATIENT
Start: 2021-01-07 | End: 2021-01-07 | Stop reason: HOSPADM

## 2021-01-07 RX ORDER — OXYCODONE HYDROCHLORIDE 5 MG/1
5-10 TABLET ORAL
Status: DISCONTINUED | OUTPATIENT
Start: 2021-01-07 | End: 2021-01-08 | Stop reason: HOSPADM

## 2021-01-07 RX ORDER — PROPOFOL 10 MG/ML
INJECTION, EMULSION INTRAVENOUS AS NEEDED
Status: DISCONTINUED | OUTPATIENT
Start: 2021-01-07 | End: 2021-01-07 | Stop reason: HOSPADM

## 2021-01-07 RX ADMIN — ONDANSETRON HYDROCHLORIDE 4 MG: 2 INJECTION INTRAMUSCULAR; INTRAVENOUS at 14:55

## 2021-01-07 RX ADMIN — OXYCODONE 5 MG: 5 TABLET ORAL at 20:44

## 2021-01-07 RX ADMIN — CEFAZOLIN 2 G: 10 INJECTION, POWDER, FOR SOLUTION INTRAVENOUS at 18:56

## 2021-01-07 RX ADMIN — PROPOFOL 30 MG: 10 INJECTION, EMULSION INTRAVENOUS at 15:09

## 2021-01-07 RX ADMIN — FENTANYL CITRATE 25 MCG: 50 INJECTION, SOLUTION INTRAMUSCULAR; INTRAVENOUS at 16:02

## 2021-01-07 RX ADMIN — INSULIN LISPRO 2 UNITS: 100 INJECTION, SOLUTION INTRAVENOUS; SUBCUTANEOUS at 23:14

## 2021-01-07 RX ADMIN — SODIUM CHLORIDE, SODIUM LACTATE, POTASSIUM CHLORIDE, AND CALCIUM CHLORIDE 125 ML/HR: 600; 310; 30; 20 INJECTION, SOLUTION INTRAVENOUS at 11:25

## 2021-01-07 RX ADMIN — INSULIN LISPRO 2 UNITS: 100 INJECTION, SOLUTION INTRAVENOUS; SUBCUTANEOUS at 11:59

## 2021-01-07 RX ADMIN — PROPOFOL 30 MG: 10 INJECTION, EMULSION INTRAVENOUS at 15:12

## 2021-01-07 RX ADMIN — PROPOFOL 20 MG: 10 INJECTION, EMULSION INTRAVENOUS at 15:27

## 2021-01-07 RX ADMIN — VANCOMYCIN HYDROCHLORIDE 1250 MG: 10 INJECTION, POWDER, LYOPHILIZED, FOR SOLUTION INTRAVENOUS at 15:15

## 2021-01-07 RX ADMIN — DOCUSATE SODIUM 50 MG AND SENNOSIDES 8.6 MG 1 TABLET: 8.6; 5 TABLET, FILM COATED ORAL at 20:44

## 2021-01-07 RX ADMIN — PROPOFOL 30 MG: 10 INJECTION, EMULSION INTRAVENOUS at 15:04

## 2021-01-07 RX ADMIN — PROPOFOL 30 MG: 10 INJECTION, EMULSION INTRAVENOUS at 15:15

## 2021-01-07 RX ADMIN — ACETAMINOPHEN 650 MG: 325 TABLET ORAL at 18:56

## 2021-01-07 RX ADMIN — LIDOCAINE HYDROCHLORIDE 40 MG: 20 INJECTION, SOLUTION EPIDURAL; INFILTRATION; INTRACAUDAL; PERINEURAL at 14:52

## 2021-01-07 RX ADMIN — FENTANYL CITRATE 25 MCG: 50 INJECTION, SOLUTION INTRAMUSCULAR; INTRAVENOUS at 16:07

## 2021-01-07 RX ADMIN — MIDAZOLAM 2 MG: 1 INJECTION INTRAMUSCULAR; INTRAVENOUS at 14:46

## 2021-01-07 RX ADMIN — PROPOFOL 20 MG: 10 INJECTION, EMULSION INTRAVENOUS at 14:55

## 2021-01-07 RX ADMIN — PROPOFOL 30 MG: 10 INJECTION, EMULSION INTRAVENOUS at 15:18

## 2021-01-07 RX ADMIN — PROPOFOL 30 MG: 10 INJECTION, EMULSION INTRAVENOUS at 15:21

## 2021-01-07 RX ADMIN — PROPOFOL 30 MG: 10 INJECTION, EMULSION INTRAVENOUS at 15:06

## 2021-01-07 RX ADMIN — ACETAMINOPHEN 650 MG: 325 TABLET ORAL at 23:11

## 2021-01-07 RX ADMIN — PROPOFOL 30 MG: 10 INJECTION, EMULSION INTRAVENOUS at 15:02

## 2021-01-07 RX ADMIN — PROPOFOL 50 MG: 10 INJECTION, EMULSION INTRAVENOUS at 14:52

## 2021-01-07 RX ADMIN — PROPOFOL 30 MG: 10 INJECTION, EMULSION INTRAVENOUS at 15:24

## 2021-01-07 RX ADMIN — PROPOFOL 30 MG: 10 INJECTION, EMULSION INTRAVENOUS at 14:58

## 2021-01-07 RX ADMIN — PROPOFOL 30 MG: 10 INJECTION, EMULSION INTRAVENOUS at 15:00

## 2021-01-07 RX ADMIN — CEFAZOLIN 2 G: 10 INJECTION, POWDER, FOR SOLUTION INTRAVENOUS at 23:11

## 2021-01-07 NOTE — PERIOP NOTES
Reviewed PTA medication list with patient/caregiver and patient/caregiver denies any additional medications. Patient admits to having a responsible adult care for them at home for at least 24 hours after surgery. Patient encouraged to use gown warming system and informed that using said warming gown to regulate body temperature prior to a procedure has been shown to help reduce the risks of blood clots and infection. Patient's pharmacy of choice verified and documented in PTA medication section.

## 2021-01-07 NOTE — PROGRESS NOTES
Vancomycin - Pharmacy to dose    Consult provided for this 76y.o. year old ,female for indication of Skin and Soft Tissue Infection. Therapy day 1        Wt Readings from Last 1 Encounters:   01/07/21 88.3 kg (194 lb 9 oz)       Ht Readings from Last 1 Encounters:   01/07/21 162.6 cm (64\")     Dosing Weight:  88.3 kg    Additional Antibiotics:  Ancef     Date:  1/7/21   Lab Results   Component Value Date/Time    Creatinine 0.89 01/06/2021 04:06 PM     creatinine clearance:  66.8 ml/min ( 1/6/20 )    white blood cell count:  8.4  ( 1/6/21 )    Vancomycin 1250 mg IV given at 15:15 today. Will continue with Vancomycin 1250 mg IV q12hrs. Trough after 4-5 doses infused. Dose calculated to approximate a therapeutic trough of 15-20 mcg/mL. Pharmacy to follow daily and will make changes to dose and/or frequency based on clinical status.       [id number] [facility], [address] [place] [address]

## 2021-01-07 NOTE — ANESTHESIA POSTPROCEDURE EVALUATION
Procedure(s):  RIGHT HAND  AND RIGHT RING FINGER IINCISION AND DEBRIDMENT. MAC    Anesthesia Post Evaluation      Multimodal analgesia: multimodal analgesia used between 6 hours prior to anesthesia start to PACU discharge  Patient location during evaluation: PACU  Patient participation: complete - patient participated  Level of consciousness: awake and alert  Pain management: satisfactory to patient  Airway patency: patent  Anesthetic complications: no  Cardiovascular status: acceptable, hemodynamically stable and blood pressure returned to baseline  Respiratory status: acceptable and room air  Hydration status: acceptable  Post anesthesia nausea and vomiting:  none  Final Post Anesthesia Temperature Assessment:  Normothermia (36.0-37.5 degrees C)      INITIAL Post-op Vital signs:   Vitals Value Taken Time   /63 01/07/21 1615   Temp 36.3 °C (97.3 °F) 01/07/21 1615   Pulse 67 01/07/21 1619   Resp 12 01/07/21 1619   SpO2 100 % 01/07/21 1619   Vitals shown include unvalidated device data.

## 2021-01-07 NOTE — INTERVAL H&P NOTE
Update History & Physical    The Patient's History and Physical of January 7th, 2021 was reviewed with the patient and I examined the patient. There was no change. The surgical site was confirmed by the patient and me. Plan:  The risk, benefits, expected outcome, and alternative to the recommended procedure have been discussed with the patient. Patient understands and wants to proceed with the procedure.     Electronically signed by Espinoza Schultz DO on 1/7/2021 at 12:55 PM

## 2021-01-07 NOTE — PERIOP NOTES
TRANSFER - OUT REPORT:    Verbal report given to [facility], RN on Lore Ao  being transferred to [facility] for routine post - op       Report consisted of patients Situation, Background, Assessment and   Recommendations(SBAR). Information from the following report(s) SBAR, Procedure Summary, Intake/Output and MAR was reviewed with the receiving nurse. Lines:   Peripheral IV 01/07/21 Left;Posterior Hand (Active)   Site Assessment Clean, dry, & intact 01/07/21 1615   Phlebitis Assessment 0 01/07/21 1615   Infiltration Assessment 0 01/07/21 1615   Dressing Status Clean, dry, & intact 01/07/21 1615   Dressing Type Tape;Transparent 01/07/21 1615   Hub Color/Line Status Pink; Infusing;Patent 01/07/21 1615   Alcohol Cap Used No 01/07/21 1132        Opportunity for questions and clarification was provided.       Patient transported with:   Registered Nurse  Tech

## 2021-01-07 NOTE — BRIEF OP NOTE
Brief Postoperative Note    Patient: Mac Chinchilla  YOB: 1955  MRN: [id number]    Date of Procedure: 1/7/2021     Pre-Op Diagnosis: RIGHT HAND ABSCESS, RIGHT RING FINGER FELON    Post-Op Diagnosis: Same as preoperative diagnosis. Procedure(s):  RIGHT HAND AND RIGHT RING FINGER INCISION AND DRAINAGE    Surgeon(s):  Christiane Lopes DO    Surgical Assistant: Surg Asst-1: Lizz WEEKS    Anesthesia: MAC with local anesthetic (0.5% marcaine 10 cc)    Estimated Blood Loss (mL): 30 cc. Complications: None    Specimens:   ID Type Source Tests Collected by Time Destination   1 : swab from right ring finger (4th digit) Wound Hand, Right CULTURE, ANAEROBIC, CULTURE, WOUND W GRAM STAIN Christiane Lopes DO 1/7/2021 1507 Microbiology   2 : swab from dorsal right hand Wound Hand, Right CULTURE, ANAEROBIC, CULTURE, WOUND W GRAM STAIN Christiane Lopes DO 1/7/2021 1520 Microbiology      Implants: none. Drains: none. Tourniquet: not used.     Packing: iodoform 1/4\" packing    Findings: right ring finger felon and dorsal hand abscess    Electronically Signed by Syeda Mix DO on 1/7/2021 at 3:46 PM

## 2021-01-07 NOTE — PERIOP NOTES
Dual skin assessment performed with Hamilton (RN), left pt in stable condition.    Visit Vitals  /68   Pulse 78   Temp 99.3 °F (37.4 °C)   Resp 12   Ht 5' 4\" (1.626 m)   Wt 88.3 kg (194 lb 9 oz)   SpO2 99%   BMI 33.40 kg/m²

## 2021-01-07 NOTE — PROGRESS NOTES
1639-Paged number to contact surgeon, Surgeon did not place a diet order. However, the number listed in the kardex is an old number and MD doesn't work there anymore, was told he works at same place with MD croft. 1643-Pacu insisting giving report and that they are trying to call  MD now for diet order. Received report pt will be NPO until MD places order. 1735-Janette SANTAMARIA said pt can have Diabetic diet if she is diabetic, diet order placed.

## 2021-01-07 NOTE — ANESTHESIA PREPROCEDURE EVALUATION
Relevant Problems   No relevant active problems       Anesthetic History   No history of anesthetic complications            Review of Systems / Medical History  Patient summary reviewed, nursing notes reviewed and pertinent labs reviewed    Pulmonary              Pertinent negatives: No recent URI  Comments: No COVID sxs   Neuro/Psych   Within defined limits           Cardiovascular    Hypertension              Exercise tolerance: >4 METS  Comments: No SOB walking up and down stairs. No hx of cardiac problems.    GI/Hepatic/Renal  Within defined limits           Pertinent negatives: No GERD   Endo/Other        Arthritis     Other Findings              Physical Exam    Airway  Mallampati: II  TM Distance: 4 - 6 cm  Neck ROM: normal range of motion   Mouth opening: Normal     Cardiovascular    Rhythm: regular  Rate: normal         Dental    Dentition: Lower partial plate and Upper partial plate     Pulmonary  Breath sounds clear to auscultation               Abdominal  GI exam deferred       Other Findings            Anesthetic Plan    ASA: 2  Anesthesia type: MAC          Induction: Intravenous  Anesthetic plan and risks discussed with: Patient

## 2021-01-07 NOTE — H&P
Patient is a pleasant 75 yo female who presented to the office on 1/6/2021 with 1 week of right dorsal hand swelling and 3 days of right ring finger swelling. Only injury she remembers is falling at [facility] the Sunday before. Denies fever/chills. Denies numbness/tingling. She has seen her PCP on Tuesday and was started on Keflex QID. She notes no improvement in her swelling, pain.      PMH: Asthma (does not use need inhaler daily), HTN, HLD  ALL: Statins    PE:    GEN: AAOx3, NAD  Head/Neck: PERRL  Cardiac: RRR, +S1/S2  Lung: No wheezing/rhonchi  Abd: Soft/NT, BSx4    R Hand:  2x2 cm abscess dorsoradial hand with purulence  Paronychia and Felon R ring finger, no drainage  SILT Distally  No Kanavel signs of flexor tenosynovitis  Brisk CR, 2+ Rad pulse  Decreased motion ring finger DIPJ secondary to pain    A/P: 75 yo F with R Hand Abscess and R Ring Finger Felon/Paronychia  -NPO/IVF  -Hold ASA, DVT ppx  -Hold Vancomycin for OR administration after Cx  -Plan for I&D R Hand and Ring Finger  -Possible admission for observation overnight and IV Abx

## 2021-01-07 NOTE — PERIOP NOTES
Paged Dr. Sai Mcnally for patient sign out. Patient meets criteria for transfer to the next phase of care.

## 2021-01-07 NOTE — PERIOP NOTES
Visit Vitals  /66   Pulse 63   Temp 97.3 °F (36.3 °C)   Resp 14   Ht 5' 4\" (1.626 m)   Wt 88.3 kg (194 lb 9 oz)   SpO2 100%   BMI 33.40 kg/m²       Intake/Output Summary (Last 24 hours) at 1/7/2021 1647  Last data filed at 1/7/2021 1645  Gross per 24 hour   Intake 500 ml   Output --   Net 500 ml

## 2021-01-08 VITALS
WEIGHT: 194.56 LBS | RESPIRATION RATE: 16 BRPM | OXYGEN SATURATION: 100 % | HEIGHT: 64 IN | TEMPERATURE: 97.4 F | DIASTOLIC BLOOD PRESSURE: 51 MMHG | HEART RATE: 74 BPM | BODY MASS INDEX: 33.22 KG/M2 | SYSTOLIC BLOOD PRESSURE: 138 MMHG

## 2021-01-08 LAB
ANION GAP SERPL CALC-SCNC: 6 MMOL/L (ref 3–18)
BUN SERPL-MCNC: 19 MG/DL (ref 7–18)
BUN/CREAT SERPL: 23 (ref 12–20)
CALCIUM SERPL-MCNC: 8.6 MG/DL (ref 8.5–10.1)
CHLORIDE SERPL-SCNC: 104 MMOL/L (ref 100–111)
CO2 SERPL-SCNC: 30 MMOL/L (ref 21–32)
CREAT SERPL-MCNC: 0.83 MG/DL (ref 0.6–1.3)
GLUCOSE BLD STRIP.AUTO-MCNC: 122 MG/DL (ref 70–110)
GLUCOSE BLD STRIP.AUTO-MCNC: 159 MG/DL (ref 70–110)
GLUCOSE SERPL-MCNC: 100 MG/DL (ref 74–99)
POTASSIUM SERPL-SCNC: 4.1 MMOL/L (ref 3.5–5.5)
SODIUM SERPL-SCNC: 140 MMOL/L (ref 136–145)

## 2021-01-08 PROCEDURE — 74011250636 HC RX REV CODE- 250/636: Performed by: INTERNAL MEDICINE

## 2021-01-08 PROCEDURE — 74011000250 HC RX REV CODE- 250: Performed by: INTERNAL MEDICINE

## 2021-01-08 PROCEDURE — 36415 COLL VENOUS BLD VENIPUNCTURE: CPT

## 2021-01-08 PROCEDURE — 74011636637 HC RX REV CODE- 636/637: Performed by: ORTHOPAEDIC SURGERY

## 2021-01-08 PROCEDURE — 80048 BASIC METABOLIC PNL TOTAL CA: CPT

## 2021-01-08 PROCEDURE — 99218 HC RM OBSERVATION: CPT

## 2021-01-08 PROCEDURE — 82962 GLUCOSE BLOOD TEST: CPT

## 2021-01-08 PROCEDURE — 74011250637 HC RX REV CODE- 250/637: Performed by: ORTHOPAEDIC SURGERY

## 2021-01-08 RX ORDER — AMOXICILLIN AND CLAVULANATE POTASSIUM 875; 125 MG/1; MG/1
1 TABLET, FILM COATED ORAL 2 TIMES DAILY WITH MEALS
Status: DISCONTINUED | OUTPATIENT
Start: 2021-01-08 | End: 2021-01-08 | Stop reason: HOSPADM

## 2021-01-08 RX ORDER — AMOXICILLIN AND CLAVULANATE POTASSIUM 875; 125 MG/1; MG/1
1 TABLET, FILM COATED ORAL 2 TIMES DAILY WITH MEALS
Qty: 28 TAB | Refills: 0 | Status: SHIPPED | OUTPATIENT
Start: 2021-01-08 | End: 2022-10-31

## 2021-01-08 RX ADMIN — HYDROMORPHONE HYDROCHLORIDE 2 MG: 2 TABLET ORAL at 07:45

## 2021-01-08 RX ADMIN — LISINOPRIL 20 MG: 20 TABLET ORAL at 10:39

## 2021-01-08 RX ADMIN — CEFTRIAXONE 1 G: 1 INJECTION, POWDER, FOR SOLUTION INTRAMUSCULAR; INTRAVENOUS at 10:38

## 2021-01-08 RX ADMIN — ASPIRIN 81 MG: 81 TABLET, COATED ORAL at 10:38

## 2021-01-08 RX ADMIN — ACETAMINOPHEN 650 MG: 325 TABLET ORAL at 12:08

## 2021-01-08 RX ADMIN — VANCOMYCIN HYDROCHLORIDE 1250 MG: 10 INJECTION, POWDER, LYOPHILIZED, FOR SOLUTION INTRAVENOUS at 03:03

## 2021-01-08 RX ADMIN — INSULIN LISPRO 2 UNITS: 100 INJECTION, SOLUTION INTRAVENOUS; SUBCUTANEOUS at 12:08

## 2021-01-08 RX ADMIN — DOCUSATE SODIUM 50 MG AND SENNOSIDES 8.6 MG 1 TABLET: 8.6; 5 TABLET, FILM COATED ORAL at 10:38

## 2021-01-08 RX ADMIN — OXYCODONE 5 MG: 5 TABLET ORAL at 03:03

## 2021-01-08 RX ADMIN — ACETAMINOPHEN 650 MG: 325 TABLET ORAL at 06:25

## 2021-01-08 NOTE — PROGRESS NOTES
01/08/21 1207   Vital Signs   Temp 97.4 °F (36.3 °C)   Temp Source Oral  (Right axilla)   Pulse (Heart Rate) 74   Heart Rate Source Monitor   Resp Rate 16   Level of Consciousness Alert   BP (!) 138/51   MAP (Calculated) 80   BP 1 Method Automatic   BP 1 Location Left arm   BP Patient Position At rest   MEWS Score 1   Pain 1   Pain Scale 1 Numeric (0 - 10)   Pain Intensity 1 0   Patient Stated Pain Goal 0   S: Patient discharge completed. B: Patient presented with discharge orders home as directed. Patient's right hand 20 gauge IV catheter was removed prior to discharge home from facility. Patient's belongings were gathered independently and patient dressed herself without assistance. Patient presented awake, alert and responsive with the following most recent vital signs listed above. A: Patient was provided discharge education, instructions and notified of follow up appointments and discharge prescriptions. Patient verbalized understanding as directed. Patient was instructed to notify nursing staff once her discharge ride presented to hospital entrance so that she could be provided wheelchair to front entrance upon discharge for safety. Patient verbalized understanding. R: Will continue with prescribed plan of care as directed.    Paco Aguiar  1/8/2021  12:07 PM

## 2021-01-08 NOTE — PROGRESS NOTES
Transition of Care (CHELSIE) Plan: Home with family assistance    Chart reviewed. CM met with the patient and informed her of the CM's role. CM and the patient discussed discharge plans to include DME, family support, follow-up appointment and transportation to and from appointments. Patient states that she was independent prior to this admission and denies any need for DME at this time. Patient states that she lives with her sister, [facility]. No transition of care needs identified at this time. Anticipate pt will be medically stable for discharge within the next 24-48 hours with physician follow up. CM available to assist as needed. CHELSIE Transportation:     How is patient being transported at discharge? Family/Friend      When? Once cleared by physician     Is transport scheduled? N/A    Follow-up appointment and transportation:   PCP/Specialist?  See AVS for Appointment       Who is transporting to the follow-up appointment? Self/Family/Friend      Is transport for follow up appointment scheduled? N/A    Communication plan (with patient/family): Who is being called? Patient or Next of Kin? Responsible party? Patient      What number(s) is to be used? See Facesheet      What service provider is calling for [facility] services? When are they calling? Readmission Risk? (Green/Low; Yellow/Moderate; Red/High):  NA    Care Management Interventions  PCP Verified by CM: Yes  Mode of Transport at Discharge:  Other (see comment)(Family)  Transition of Care Consult (CM Consult): Discharge Planning  Current Support Network: Family Lives Nearby(lives with sister, [facility])  Confirm Follow Up Transport: Family  The Plan for Transition of Care is Related to the Following Treatment Goals : Right hand I&D  Discharge Location  Discharge Placement: Home with family assistance

## 2021-01-08 NOTE — CONSULTS
[facility]  (A Division of [address] [place] [address])      Consultation Note      Date of Admission: 1/7/2021    Date of Consultation: 1/8/2021    Referred by: Fabiola Espinoza DO    Reason for Referral: Estela      Current Antimicrobials:    Prior Antimicrobials:  1. Cefazolin IV (1/7-) #1  2. Vanco IV (1/7-) #1 1. Cephalexin PO (before admission)       Assessment: Rec / Plan:   R Ring finger felon - with concomitant dorsal/pully abscess - s/p I&D 1/7 1/6:  CRP 106mg/L    Better. Drained. Danielle Bleacher by me to go home today to finish 2wks PO amox-clav (better tissue penetration than cephalexin). ->POD #1    Home. CAX IV once out the door, and then amox-clav 875mg PO BID for 2wks. I'll write script. No follow up me necessary as long as surgeon has eyes on hand/finger. HTN    DMT2 fair control A1c 7.6%                  Microbiology:  1/7 - OR#1 - (+) GPC in pairs      OR#2 - NOS      Lines / Catheters: peripheral      HPI:  CC:  \"I feel better\"  Ms Marshall Lopes is a RH 65y AAF who recalls a cuticle injury to R Ring-finger approx 2wks ago -- about the time she had a small fall at [facility]; more noticeable since prompting admission/OR yesterday where she had her paranychia and dorsal hand abscess I&D'd. Started on cefazolin/vanco overnight after cultures obtained. Ready to go home. Retired [place] Problems    Diagnosis Date Noted    Abscess of dorsum of hand, right 01/07/2021    Felon of finger of right hand 01/07/2021     Past Medical History:   Diagnosis Date    Ankle fracture, left     Arthritis     Hypertension      Past Surgical History:   Procedure Laterality Date    HX HYSTERECTOMY  2006    HX OTHER SURGICAL Left 2017    ankle replacement     History reviewed. No pertinent family history.   Social History     Socioeconomic History    Marital status:      Spouse name: Not on file    Number of children: Not on file    Years of education: Not on file    Highest education level: Not on file   Occupational History    Not on file   Social Needs    Financial resource strain: Not on file    Food insecurity     Worry: Not on file     Inability: Not on file    Transportation needs     Medical: Not on file     Non-medical: Not on file   Tobacco Use    Smoking status: Never Smoker   Substance and Sexual Activity    Alcohol use: Not Currently    Drug use: Never    Sexual activity: Not on file   Lifestyle    Physical activity     Days per week: Not on file     Minutes per session: Not on file    Stress: Not on file   Relationships    Social connections     Talks on phone: Not on file     Gets together: Not on file     Attends Christianity service: Not on file     Active member of club or organization: Not on file     Attends meetings of clubs or organizations: Not on file     Relationship status: Not on file    Intimate partner violence     Fear of current or ex partner: Not on file     Emotionally abused: Not on file     Physically abused: Not on file     Forced sexual activity: Not on file   Other Topics Concern    Not on file   Social History Narrative    Not on file       Allergies:   Other medication     Medications:  Current Facility-Administered Medications   Medication Dose Route Frequency    cefTRIAXone (ROCEPHIN) 1 g in sterile water (preservative free) 10 mL IV syringe  1 g IntraVENous Q24H    aspirin delayed-release tablet 81 mg  81 mg Oral DAILY    cyclobenzaprine (FLEXERIL) tablet 10 mg  10 mg Oral TID PRN    lisinopriL (PRINIVIL, ZESTRIL) tablet 20 mg  20 mg Oral DAILY    sodium chloride (NS) flush 5-40 mL  5-40 mL IntraVENous Q8H    sodium chloride (NS) flush 5-40 mL  5-40 mL IntraVENous PRN    lactated Ringers infusion  75 mL/hr IntraVENous CONTINUOUS    acetaminophen (TYLENOL) tablet 650 mg  650 mg Oral Q6H    acetaminophen (TYLENOL) tablet 650 mg  650 mg Oral Q6H PRN    oxyCODONE IR (ROXICODONE) tablet 5-10 mg 5-10 mg Oral Q4H PRN    HYDROmorphone (DILAUDID) tablet 2 mg  2 mg Oral Q4H PRN    ketorolac (TORADOL) injection 15 mg  15 mg IntraVENous Q6H PRN    HYDROmorphone (PF) (DILAUDID) injection 1 mg  1 mg IntraVENous Q4H PRN    naloxone (NARCAN) injection 0.4 mg  0.4 mg IntraVENous PRN    ondansetron (ZOFRAN) injection 4 mg  4 mg IntraVENous Q6H PRN    bisacodyL (DULCOLAX) suppository 10 mg  10 mg Rectal DAILY PRN    senna-docusate (PERICOLACE) 8.6-50 mg per tablet 1 Tab  1 Tab Oral BID    insulin lispro (HUMALOG) injection   SubCUTAneous AC&HS    glucose chewable tablet 16 g  4 Tab Oral PRN    glucagon (GLUCAGEN) injection 1 mg  1 mg IntraMUSCular PRN    dextrose 10% infusion 125-250 mL  125-250 mL IntraVENous PRN        ROS:  Constitutional: negative for fevers, chills and sweats  Respiratory: negative for cough, sputum or dyspnea on exertion  Cardiovascular: negative for chest pain, dyspnea  Gastrointestinal: positive for constipation, negative for nausea, vomiting and diarrhea     Physical Exam:    HPI:  Temp (24hrs), Av °F (36.7 °C), Min:97 °F (36.1 °C), Max:99.3 °F (37.4 °C)    Visit Vitals  /65   Pulse 63   Temp 98.4 °F (36.9 °C)   Resp 14   Ht 5' 4\" (1.626 m)   Wt 88.3 kg (194 lb 9 oz)   SpO2 100%   BMI 33.40 kg/m²       General: Well developed, well nourished 76 y.o. BLACK OR  female in no acute distress. ENT: ENT exam normal, no neck nodes or sinus tenderness  Head: normocephalic, without obvious abnormality  Mouth:  mucous membranes moist, pharynx normal without lesions  Neck: supple, symmetrical, trachea midline   Cardio:  regular rate and rhythm, S1, S2 normal, no murmur, click, rub or gallop  Chest: inspection normal - no chest wall deformities or tenderness, respiratory effort normal  Lungs: clear to auscultation, no wheezes or rales and unlabored breathing  Abdomen: soft, non-tender. Bowel sounds normal. No masses, no organomegaly.   Extremities:  no redness or tenderness in the calves or thighs, no edema, R hand wrapped/ring finger wrapped  Neuro: Grossly normal       Lab results:    Chemistry  Recent Labs     01/08/21  0115 01/06/21  1606   * 237*    137   K 4.1 3.9    100   CO2 30 32   BUN 19* 15   CREA 0.83 0.89   CA 8.6 9.2   AGAP 6 5   BUCR 23* 17   AP  --  110   TP  --  7.6   ALB  --  3.6   GLOB  --  4.0   AGRAT  --  0.9       CBC w/ Diff  Recent Labs     01/06/21  1606   WBC 8.4   RBC 4.28   HGB 13.0   HCT 38.7      GRANS 67   LYMPH 26   EOS 1       Microbiology  All Micro Results     Procedure Component Value Units Date/Time    CULTURE, Bony Merl STAIN [[id number]] Collected: 01/07/21 1600    Order Status: Completed Specimen: Wound from Hand Updated: 01/07/21 2312     Special Requests: NO SPECIAL REQUESTS        GRAM STAIN OCCASIONAL WBCS SEEN               OCCASIONAL GRAM POSITIVE COCCI IN PAIRS           Culture result: PENDING    CULTURE, Bony Merl STAIN [[id number]] Collected: 01/07/21 1600    Order Status: Completed Specimen: Wound from Finger Updated: 01/07/21 2301     Special Requests: NO SPECIAL REQUESTS        GRAM STAIN NO WBC'S SEEN         NO ORGANISMS SEEN        Culture result: PENDING    CULTURE, ANAEROBIC [[id number]] Collected: 01/07/21 1600    Order Status: Completed Specimen: Wound Drainage Updated: 01/07/21 2252    CULTURE, ANAEROBIC [[id number]] Collected: 01/07/21 1600    Order Status: Completed Specimen: Wound Drainage Updated: 01/07/21 [address], [place]  Cell [phone]  [facility]  1/8/2021   10:11 AM

## 2021-01-08 NOTE — PROGRESS NOTES
Patient seen & examined at bedside this AM.  She notes improvement in the hand and ring finger pain post op. Slight burning at the dorsal hand incision. Denies fever/chills. Denies nausea/vomiting. She has been eating without difficulties. No complaints. ESR 57  CRP 10.6  WBC 8.4    T max 99.3    PE:    GEN: AAOx3, NAD, lying in bed reading book    R Hand:    Dressing mild serosanguinous drainage dorsal hand, minimal drainage ring finger  Dressing changed at bedside with advancement of packing dorsal hand  Improved erythema/edema ring finger and hand  No signs of worsening/spread of infection  SILT grossly  Finger/Wrist flexion/extension intact  Brisk CR, 2+ Rad pulse    A/P: 77 yo F with R Hand Abscess and Ring Finger Felon POD#1  -pain control  -Ambulate Ad mark  -Strict elevation R hand to decrease swelling/pain  -FU ID recommendations regarding Abx. Consider soaks ring finger.  -Keep dressing over dorsal hand intact/clean.   May change as needed for drainage.  -Plan for discharge home today if cleared with ID  -Patient will follow up in the office on Monday

## 2021-01-08 NOTE — OP NOTES
[facility]  OPERATIVE REPORT     Name:  Chela Barron  MR#:   [id number]  :  1955  ACCOUNT #:  [de-identified]  DATE OF SERVICE:  2021     PREOPERATIVE DIAGNOSIS:  Right Hand Abscess and Ring Finger Felon.     POSTOPERATIVE DIAGNOSIS:  Right Hand Abscess and Ring Finger Felon.     PROCEDURES PERFORMED: Right hand and ring finger incision and drainage.     SURGEON:  Grant Carrington DO     ASSISTANT:  Nilesh Sandoval, Surgical Assist     ANESTHESIA:  MAC with local anesthetic.     COMPLICATIONS:  None.     SPECIMENS REMOVED: Aerobic and Anaerobic cultures dorsal hand and ring finger.     IMPLANTS: none.     ESTIMATED BLOOD LOSS:  30 mL.     ANTIBIOTICS:  Vancomycin IVPB.     TOURNIQUET:  Not used.     DRAINS:  None.     INDICATION FOR PROCEDURE:  The patient is a pleasant 69-year-old woman who presented to the office with approximately one week of right dorsal hand swelling/erythema/edema that progressed to purulent drainage. She also noted swelling of the tip right ring finger after a fall at [employer] three days prior. Patient was admitted for incision and drainage of right hand and ring finger. The patient understood all risks, benefits, and alternatives to surgery including persistent infection, bleeding, neurovascular injury, compartment syndrome, and need for further surgery. The patient voiced understanding and wanted to proceed with indicated operative procedure.     DESCRIPTION OF PROCEDURE:  The patient was identified in the holding area and operative site was marked. Consent was confirmed. She was then transported to the operating room and placed on the operating room table in supine position. Tourniquet was placed on the right upper arm. She was induced under MAC anesthesia by the anesthesia team.  The right hand and arm were then prepped and draped in normal sterile fashion.   At this time, a time-out was called and operative consent was confirmed, confirmation of antibiotics on stand-by, instruments and implants needed and any concerns were addressed. Local anesthetic, 0.5% marcaine without epinephrine, was injected around the dorsal hand lesion and a ring finger block was carried out using a total of 10 cc of local.  A #11 blade was then used to incise the eponychium and copious purulent discharge was expressed. Cultures were taken aerobic and anaerobic of the fluid. This decompressed the paronychium as well as the eponychium pockets. The #11 blade was also used to make a stab incision in the distal volar pad to express the purulent pocket. A snap was used to spread and break inoculations in the volar pad. A new #11 blade was then used to make a 1 cm linear incision overlying the dorsal hand abscess. Moderate purulent drainage was met which was also cultured aerobic and anaerobic. At this time, anesthesia administered the Vancomycin IV PB antibiotics. Clamp was used to break pockets and inoculations in the abscess until sufficiently drained. The dorsal hand and ring finger were then copiously irrigated with normal saline fluid. Iodoform packing 1/4\" was packed into the dorsal abscess. Wet and dry were used to cleanse the hand. Xeroform was placed over the ring finger and dorsal hand, followed by sterile 4 x 4, Alex and Ace wrap. The patient was then awoken from anesthesia by the anesthesia team and transported to the stretcher and then PACU in stable condition.     PLAN OF CARE:  The patient is to keep dressing clean/dry/intact, strictly elevate the right hand. She will be admitted for observation for IV antibiotics. Infectious disease will be consulted for antibiotic recommendations and duration. She will follow up in the office in 2-3 days.

## 2021-01-08 NOTE — DISCHARGE SUMMARY
Simon Mcneil is a 75 yo female who presented to the office with right hand dorsal abscess and ring finger felon/paronychia. She was admitted to [facility] on 1/7/2021 for I&D. Patient underwent the procedure without complications. Cultures demonstrate GPC in pairs. She was seen by SAADIA Denney, who recommended Augmentin BID x 2 weeks. She will be discharged home 1/8/2021 in stable condition with self-care and instructions on the discharge regarding care of her surgical site. She is to follow up in the office Monday 1/11/2021.

## 2021-01-08 NOTE — DISCHARGE INSTRUCTIONS
Keep dressing clean. May change if excessive drainage noted. Do not remove packing. Strictly elevate hand to decrease swelling and pain. Augmentin PO Abx BID x 2 weeks per ID recommendations. Follow up in the office on Monday 1/11/2021. Call the office [place], [phone], if there are any concerns over the weekend.

## 2021-01-08 NOTE — PROGRESS NOTES
1922: Bedside report received from Shahid Miller RN. Assumed care of pt at this time. Pt in bed with no signs of distress. Pt left with call light within reach and encouraged to call for assistance. 2044: Oxycodone 5mg given PRN.     0303: Oxycodone 5mg given PRN. 0745: Dilaudid 2mg given PRN. Bedside shift change report given to Amadou Mayes RN (oncoming nurse) by Shahid GERMAIN RN (offgoing nurse). Report included the following information SBAR, Kardex and MAR. Shift Summary: Voiding without issue. Pain remained control with medication. Ace bandage changed by FLORIDALMA BURNETT. Up ad mark.

## 2021-01-08 NOTE — ROUTINE PROCESS
Verbal shift change report given to Mickey Ambrocio RN by Zane Perez RN. Report included the following information SBAR, Kardex, OR Summary, Intake/Output and MAR.

## 2021-01-09 LAB
BACTERIA SPEC CULT: ABNORMAL
BACTERIA SPEC CULT: NORMAL
BACTERIA SPEC CULT: NORMAL
GRAM STN SPEC: ABNORMAL
GRAM STN SPEC: ABNORMAL
SERVICE CMNT-IMP: ABNORMAL
SERVICE CMNT-IMP: NORMAL
SERVICE CMNT-IMP: NORMAL

## 2021-01-10 LAB
BACTERIA SPEC CULT: ABNORMAL
BACTERIA SPEC CULT: ABNORMAL
GRAM STN SPEC: ABNORMAL
GRAM STN SPEC: ABNORMAL
SERVICE CMNT-IMP: ABNORMAL

## 2021-11-03 ENCOUNTER — HOSPITAL ENCOUNTER (EMERGENCY)
Age: 66
Discharge: HOME | End: 2021-11-03
Attending: EMERGENCY MEDICINE
Payer: MEDICARE

## 2021-11-03 ENCOUNTER — APPOINTMENT (OUTPATIENT)
Dept: GENERAL RADIOLOGY | Age: 66
End: 2021-11-03
Attending: EMERGENCY MEDICINE
Payer: MEDICARE

## 2021-11-03 ENCOUNTER — APPOINTMENT (OUTPATIENT)
Dept: CT IMAGING | Age: 66
End: 2021-11-03
Attending: EMERGENCY MEDICINE
Payer: MEDICARE

## 2021-11-03 VITALS
SYSTOLIC BLOOD PRESSURE: 165 MMHG | TEMPERATURE: 101.2 F | DIASTOLIC BLOOD PRESSURE: 81 MMHG | OXYGEN SATURATION: 99 % | HEART RATE: 108 BPM | RESPIRATION RATE: 20 BRPM

## 2021-11-03 DIAGNOSIS — J12.82 PNEUMONIA DUE TO COVID-19 VIRUS: ICD-10-CM

## 2021-11-03 DIAGNOSIS — U07.1 PNEUMONIA DUE TO COVID-19 VIRUS: ICD-10-CM

## 2021-11-03 DIAGNOSIS — U07.1 COVID: Primary | ICD-10-CM

## 2021-11-03 LAB
ALBUMIN SERPL-MCNC: 3 G/DL (ref 3.4–5)
ALBUMIN/GLOB SERPL: 0.6 {RATIO} (ref 0.8–1.7)
ALP SERPL-CCNC: 67 U/L (ref 45–117)
ALT SERPL-CCNC: 49 U/L (ref 13–56)
ANION GAP SERPL CALC-SCNC: 6 MMOL/L (ref 3–18)
AST SERPL-CCNC: 71 U/L (ref 10–38)
ATRIAL RATE: 108 BPM
BASOPHILS # BLD: 0 K/UL (ref 0–0.1)
BASOPHILS NFR BLD: 0 % (ref 0–2)
BILIRUB SERPL-MCNC: 0.8 MG/DL (ref 0.2–1)
BUN SERPL-MCNC: 17 MG/DL (ref 7–18)
BUN/CREAT SERPL: 15 (ref 12–20)
CALCIUM SERPL-MCNC: 9.3 MG/DL (ref 8.5–10.1)
CALCULATED P AXIS, ECG09: 46 DEGREES
CALCULATED R AXIS, ECG10: -33 DEGREES
CALCULATED T AXIS, ECG11: 57 DEGREES
CHLORIDE SERPL-SCNC: 97 MMOL/L (ref 100–111)
CO2 SERPL-SCNC: 30 MMOL/L (ref 21–32)
COVID-19 RAPID TEST, COVR: DETECTED
CREAT SERPL-MCNC: 1.16 MG/DL (ref 0.6–1.3)
DIAGNOSIS, 93000: NORMAL
DIFFERENTIAL METHOD BLD: ABNORMAL
EOSINOPHIL # BLD: 0 K/UL (ref 0–0.4)
EOSINOPHIL NFR BLD: 0 % (ref 0–5)
ERYTHROCYTE [DISTWIDTH] IN BLOOD BY AUTOMATED COUNT: 13.2 % (ref 11.6–14.5)
FLUAV AG NPH QL IA: NEGATIVE
FLUBV AG NOSE QL IA: NEGATIVE
GLOBULIN SER CALC-MCNC: 5.2 G/DL (ref 2–4)
GLUCOSE SERPL-MCNC: 203 MG/DL (ref 74–99)
HCT VFR BLD AUTO: 35.9 % (ref 35–45)
HGB BLD-MCNC: 12.4 G/DL (ref 12–16)
LACTATE SERPL-SCNC: 1.8 MMOL/L (ref 0.4–2)
LIPASE SERPL-CCNC: 104 U/L (ref 73–393)
LYMPHOCYTES # BLD: 1.4 K/UL (ref 0.9–3.6)
LYMPHOCYTES NFR BLD: 20 % (ref 21–52)
MCH RBC QN AUTO: 30 PG (ref 24–34)
MCHC RBC AUTO-ENTMCNC: 34.5 G/DL (ref 31–37)
MCV RBC AUTO: 86.9 FL (ref 78–100)
MONOCYTES # BLD: 0.1 K/UL (ref 0.05–1.2)
MONOCYTES NFR BLD: 1 % (ref 3–10)
NEUTS BAND NFR BLD MANUAL: 5 % (ref 0–5)
NEUTS SEG # BLD: 5.3 K/UL (ref 1.8–8)
NEUTS SEG NFR BLD: 74 % (ref 40–73)
P-R INTERVAL, ECG05: 146 MS
PLATELET # BLD AUTO: 220 K/UL (ref 135–420)
PLATELET COMMENTS,PCOM: ABNORMAL
PMV BLD AUTO: 10.6 FL (ref 9.2–11.8)
POTASSIUM SERPL-SCNC: 3.2 MMOL/L (ref 3.5–5.5)
PROT SERPL-MCNC: 8.2 G/DL (ref 6.4–8.2)
Q-T INTERVAL, ECG07: 324 MS
QRS DURATION, ECG06: 76 MS
QTC CALCULATION (BEZET), ECG08: 434 MS
RBC # BLD AUTO: 4.13 M/UL (ref 4.2–5.3)
RBC MORPH BLD: ABNORMAL
SODIUM SERPL-SCNC: 133 MMOL/L (ref 136–145)
SOURCE, COVRS: ABNORMAL
VENTRICULAR RATE, ECG03: 108 BPM
WBC # BLD AUTO: 6.8 K/UL (ref 4.6–13.2)
WBC MORPH BLD: ABNORMAL

## 2021-11-03 PROCEDURE — 96361 HYDRATE IV INFUSION ADD-ON: CPT

## 2021-11-03 PROCEDURE — 99285 EMERGENCY DEPT VISIT HI MDM: CPT

## 2021-11-03 PROCEDURE — 71275 CT ANGIOGRAPHY CHEST: CPT

## 2021-11-03 PROCEDURE — 96374 THER/PROPH/DIAG INJ IV PUSH: CPT

## 2021-11-03 PROCEDURE — 85025 COMPLETE CBC W/AUTO DIFF WBC: CPT

## 2021-11-03 PROCEDURE — 71046 X-RAY EXAM CHEST 2 VIEWS: CPT

## 2021-11-03 PROCEDURE — 74011000636 HC RX REV CODE- 636: Performed by: EMERGENCY MEDICINE

## 2021-11-03 PROCEDURE — 74011250636 HC RX REV CODE- 250/636: Performed by: EMERGENCY MEDICINE

## 2021-11-03 PROCEDURE — 87804 INFLUENZA ASSAY W/OPTIC: CPT

## 2021-11-03 PROCEDURE — 83605 ASSAY OF LACTIC ACID: CPT

## 2021-11-03 PROCEDURE — 83690 ASSAY OF LIPASE: CPT

## 2021-11-03 PROCEDURE — 87635 SARS-COV-2 COVID-19 AMP PRB: CPT

## 2021-11-03 PROCEDURE — 87040 BLOOD CULTURE FOR BACTERIA: CPT

## 2021-11-03 PROCEDURE — 80053 COMPREHEN METABOLIC PANEL: CPT

## 2021-11-03 PROCEDURE — 74011250637 HC RX REV CODE- 250/637: Performed by: EMERGENCY MEDICINE

## 2021-11-03 PROCEDURE — 93005 ELECTROCARDIOGRAM TRACING: CPT

## 2021-11-03 RX ORDER — ACETAMINOPHEN 500 MG
1000 TABLET ORAL ONCE
Status: COMPLETED | OUTPATIENT
Start: 2021-11-03 | End: 2021-11-03

## 2021-11-03 RX ORDER — PREDNISONE 20 MG/1
60 TABLET ORAL DAILY
Qty: 15 TABLET | Refills: 0 | Status: SHIPPED | OUTPATIENT
Start: 2021-11-03 | End: 2021-11-08

## 2021-11-03 RX ORDER — DEXAMETHASONE SODIUM PHOSPHATE 10 MG/ML
10 INJECTION INTRAMUSCULAR; INTRAVENOUS
Status: COMPLETED | OUTPATIENT
Start: 2021-11-03 | End: 2021-11-03

## 2021-11-03 RX ORDER — CODEINE PHOSPHATE AND GUAIFENESIN 10; 100 MG/5ML; MG/5ML
5 SOLUTION ORAL
Qty: 100 ML | Refills: 0 | Status: SHIPPED | OUTPATIENT
Start: 2021-11-03 | End: 2021-11-10

## 2021-11-03 RX ORDER — ALBUTEROL SULFATE 90 UG/1
2 AEROSOL, METERED RESPIRATORY (INHALATION)
Status: DISCONTINUED | OUTPATIENT
Start: 2021-11-03 | End: 2021-11-04 | Stop reason: HOSPADM

## 2021-11-03 RX ADMIN — POTASSIUM BICARBONATE 50 MEQ: 977.5 TABLET, EFFERVESCENT ORAL at 18:17

## 2021-11-03 RX ADMIN — SODIUM CHLORIDE 1000 ML: 900 INJECTION, SOLUTION INTRAVENOUS at 17:11

## 2021-11-03 RX ADMIN — DEXAMETHASONE SODIUM PHOSPHATE 10 MG: 10 INJECTION, SOLUTION INTRAMUSCULAR; INTRAVENOUS at 18:16

## 2021-11-03 RX ADMIN — IOPAMIDOL 85 ML: 755 INJECTION, SOLUTION INTRAVENOUS at 18:59

## 2021-11-03 RX ADMIN — ACETAMINOPHEN 1000 MG: 500 TABLET ORAL at 19:16

## 2021-11-03 NOTE — ED PROVIDER NOTES
48-year-old female presents to the emergency room with complaint of 10 days of fever chills nausea vomiting and body aches. She arrived to the emergency department acutely febrile to 102.7 and tachycardic. Oxygen saturations on room air were 94%. She was placed in a room IV was started she was given fluid resuscitation and Tylenol and labs were sent. Chest x-ray was done. Isolation precautions taken due to likely COVID-19. Patient is not vaccinated           Past Medical History:   Diagnosis Date    Ankle fracture, left     Arthritis     Hypertension        Past Surgical History:   Procedure Laterality Date    HX HYSTERECTOMY  2006    HX OTHER SURGICAL Left 2017    ankle replacement         No family history on file. Social History     Socioeconomic History    Marital status:      Spouse name: Not on file    Number of children: Not on file    Years of education: Not on file    Highest education level: Not on file   Occupational History    Not on file   Tobacco Use    Smoking status: Never Smoker    Smokeless tobacco: Not on file   Substance and Sexual Activity    Alcohol use: Not Currently    Drug use: Never    Sexual activity: Not on file   Other Topics Concern    Not on file   Social History Narrative    Not on file     Social Determinants of Health     Financial Resource Strain:     Difficulty of Paying Living Expenses: Not on file   Food Insecurity:     Worried About Running Out of Food in the Last Year: Not on file    Nena of Food in the Last Year: Not on file   Transportation Needs:     Lack of Transportation (Medical): Not on file    Lack of Transportation (Non-Medical):  Not on file   Physical Activity:     Days of Exercise per Week: Not on file    Minutes of Exercise per Session: Not on file   Stress:     Feeling of Stress : Not on file   Social Connections:     Frequency of Communication with Friends and Family: Not on file    Frequency of Social Gatherings with Friends and Family: Not on file    Attends Bahai Services: Not on file    Active Member of Clubs or Organizations: Not on file    Attends Club or Organization Meetings: Not on file    Marital Status: Not on file   Intimate Partner Violence:     Fear of Current or Ex-Partner: Not on file    Emotionally Abused: Not on file    Physically Abused: Not on file    Sexually Abused: Not on file   Housing Stability:     Unable to Pay for Housing in the Last Year: Not on file    Number of [place] in the Last Year: Not on file    Unstable Housing in the Last Year: Not on file         ALLERGIES: Other medication    Review of Systems   Constitutional: Positive for activity change, appetite change, fatigue and fever. Negative for chills, diaphoresis and unexpected weight change. HENT: Positive for congestion, postnasal drip, sinus pressure and sinus pain. Eyes: Negative. Respiratory: Positive for cough, shortness of breath and wheezing. Cardiovascular: Negative. Gastrointestinal: Negative. Genitourinary: Negative. Musculoskeletal: Positive for arthralgias, back pain and myalgias. Negative for gait problem, joint swelling, neck pain and neck stiffness. Skin: Negative. Neurological: Negative. Hematological: Negative. Psychiatric/Behavioral: Negative. Vitals:    11/03/21 1617 11/03/21 1618 11/03/21 1619 11/03/21 1620   BP:       Pulse: (!) 107 (!) 108 (!) 106 (!) 106   Resp: 25 25 28 27   Temp:       SpO2: 95% 94% 95% 92%            Physical Exam  Vitals and nursing note reviewed. Constitutional:       General: She is not in acute distress. Appearance: Normal appearance. She is normal weight. She is ill-appearing. She is not toxic-appearing or diaphoretic. HENT:      Head: Normocephalic and atraumatic. Right Ear: Tympanic membrane and ear canal normal.      Left Ear: Tympanic membrane and ear canal normal.      Nose: No congestion or rhinorrhea.       Mouth/Throat: Mouth: Mucous membranes are dry. Pharynx: Oropharynx is clear. No oropharyngeal exudate. Eyes:      Extraocular Movements: Extraocular movements intact. Pupils: Pupils are equal, round, and reactive to light. Cardiovascular:      Rate and Rhythm: Tachycardia present. Pulses: Normal pulses. Heart sounds: Normal heart sounds. No murmur heard. No friction rub. No gallop. Pulmonary:      Effort: No respiratory distress. Breath sounds: No stridor. Wheezing, rhonchi and rales present. Chest:      Chest wall: No tenderness. Abdominal:      General: Abdomen is flat. There is no distension. Palpations: Abdomen is soft. There is no mass. Tenderness: There is no abdominal tenderness. There is no guarding or rebound. Hernia: No hernia is present. Musculoskeletal:         General: No swelling, tenderness, deformity or signs of injury. Normal range of motion. Skin:     General: Skin is warm. Capillary Refill: Capillary refill takes less than 2 seconds. Coloration: Skin is not jaundiced or pale. Findings: No bruising. Neurological:      General: No focal deficit present. Mental Status: She is alert. Mental status is at baseline. She is disoriented. Psychiatric:         Mood and Affect: Mood normal.         Behavior: Behavior normal.          MDM  Number of Diagnoses or Management Options  COVID  Pneumonia due to COVID-19 virus  Diagnosis management comments: 69-year-old female presents emergency department with 10 to 11 days of fevers chills cough shortness of breath nausea and vomiting. She presented to the emergency department acutely febrile to 102.7 with a pulse of 106 she was not hypotensive. Placed in a room on droplet precautions patient is not vaccinated for Covid. On exam she was saturating 92% on room air. Lungs she has scattered wheezes and rhonchi bilaterally heart sounds were tachycardic. Abdomen was soft nontender nondistended. Patient was speaking in full sentences. And oriented x3. GCS of 15. X-ray showed scattered infiltrates consistent with COVID-19 pneumonia. COVID-19 testing was positive. Patient was found to be mildly hypokalemic which was replaced in the ED. He was given albuterol via MDI pump as well as IV Decadron. After fluid resuscitation heart rate has improved to 100 however patient continues to saturate 93%-95% on room air. Doing better after tylenol, fluid, decadron, and albuterol. CTA shows no PE. PT will be discharged home with continued steroids, Albuterol, and robitussin ac with instructions to isolate and take tylenol and ibuprofen. Given return precautions.  As no O2 requirement DC home           Critical Care  Performed by: Mariam Williamson MD  Authorized by: Mariam Williamson MD     Critical care provider statement:     Critical care time (minutes):  45    Critical care was time spent personally by me on the following activities:  Development of treatment plan with patient or surrogate, discussions with consultants, evaluation of patient's response to treatment, examination of patient, ordering and performing treatments and interventions, ordering and review of laboratory studies, ordering and review of radiographic studies, pulse oximetry and review of old charts

## 2021-11-04 ENCOUNTER — PATIENT OUTREACH (OUTPATIENT)
Dept: CASE MANAGEMENT | Age: 66
End: 2021-11-04

## 2021-11-04 NOTE — PROGRESS NOTES
Patient contacted regarding COVID-19 diagnosis. Discussed COVID-19 related testing which was available at this time. Test results were positive. Patient informed of results, if available? Pt. Is aware of her covid test result. COVID-19 RAPID TEST  Order: [id number]   Collected 11/3/2021 16:26     Status: Final result     0 Result Notes     Ref Range & Units 11/3/21 1626   Specimen source   Nasopharyngeal    COVID-19 rapid test NOTD   Detected Panic            Care Transition Nurse contacted the patient by telephone to perform post discharge assessment. Call within 2 business days of discharge: Yes Verified name and  with patient as identifiers. Provided introduction to self, and explanation of the CTN/ACM role, and reason for call due to risk factors for infection and/or exposure to COVID-19. Patient reports that she is still feeling the same. No new or worsening of symptoms reported by Pt. At this time. Pt. Reports that she has good support from family. Pt. States that her daughter in law is a doctor and will  Pt. Medications. Pt. States that she lives alone and  following self quarantine. No questions, concerns and/or needs at this time as per Pt. Reminded Pt. to go to the nearest emergency room for chest pain, shortness of breath, returning of symptoms that brought her to the emergency room and/or worsening of symptoms. Pt. Verbalized and repeated back understanding. Symptoms reviewed with patient who verbalized the following symptoms: no new symptoms and no worsening symptoms      Due to no new or worsening symptoms encounter was not routed to provider for escalation. Discussed follow-up appointments. Pt. Is waiting a call from PCP office for [facility]. [facility] follow up appointment(s): No future appointments. Non-[facility] follow up appointment(s): none noted at this time. Interventions to address risk factors: closely follow up with PCP.       Advance Care Planning:   Does patient have an Advance Directive: not on file. Educated patient about risk for severe COVID-19 due to risk factors according to CDC guidelines. CTN reviewed discharge instructions, medical action plan and red flag symptoms with the patient who verbalized understanding. Discussed COVID vaccination status: yes. Education provided on COVID-19 vaccination as appropriate. Discussed exposure protocols and quarantine with CDC Guidelines. Patient was given an opportunity to verbalize any questions and concerns and agrees to contact CTN or health care provider for questions related to their healthcare. Reviewed and educated patient on any new and changed medications related to discharge diagnosis     Was patient discharged with a pulse oximeter? no     CTN provided contact information. Plan for follow up call in 7-14 days  based on severity of symptoms and risk factors.

## 2021-11-09 LAB
BACTERIA SPEC CULT: NORMAL
SERVICE CMNT-IMP: NORMAL

## 2022-10-19 ENCOUNTER — TRANSCRIBE ORDER (OUTPATIENT)
Dept: REGISTRATION | Age: 67
End: 2022-10-19

## 2022-10-19 ENCOUNTER — HOSPITAL ENCOUNTER (OUTPATIENT)
Dept: PREADMISSION TESTING | Age: 67
Discharge: HOME | End: 2022-10-19
Payer: MEDICARE

## 2022-10-19 VITALS — WEIGHT: 194 LBS | HEIGHT: 64 IN | BODY MASS INDEX: 33.12 KG/M2

## 2022-10-19 DIAGNOSIS — N62 HYPERTROPHY OF BREAST: Primary | ICD-10-CM

## 2022-10-19 DIAGNOSIS — N62 HYPERTROPHY OF BREAST: ICD-10-CM

## 2022-10-19 LAB
ANION GAP SERPL CALC-SCNC: 5 MMOL/L (ref 3–18)
ATRIAL RATE: 56 BPM
BASOPHILS # BLD: 0 K/UL (ref 0–0.1)
BASOPHILS NFR BLD: 1 % (ref 0–2)
BUN SERPL-MCNC: 16 MG/DL (ref 7–18)
BUN/CREAT SERPL: 18 (ref 12–20)
CALCIUM SERPL-MCNC: 9.4 MG/DL (ref 8.5–10.1)
CALCULATED P AXIS, ECG09: 40 DEGREES
CALCULATED R AXIS, ECG10: 28 DEGREES
CALCULATED T AXIS, ECG11: 71 DEGREES
CHLORIDE SERPL-SCNC: 102 MMOL/L (ref 100–111)
CO2 SERPL-SCNC: 33 MMOL/L (ref 21–32)
CREAT SERPL-MCNC: 0.89 MG/DL (ref 0.6–1.3)
DIAGNOSIS, 93000: NORMAL
DIFFERENTIAL METHOD BLD: ABNORMAL
EOSINOPHIL # BLD: 0.1 K/UL (ref 0–0.4)
EOSINOPHIL NFR BLD: 1 % (ref 0–5)
ERYTHROCYTE [DISTWIDTH] IN BLOOD BY AUTOMATED COUNT: 14.9 % (ref 11.6–14.5)
GLUCOSE SERPL-MCNC: 126 MG/DL (ref 74–99)
HCT VFR BLD AUTO: 37.3 % (ref 35–45)
HGB BLD-MCNC: 12.8 G/DL (ref 12–16)
IMM GRANULOCYTES # BLD AUTO: 0 K/UL (ref 0–0.04)
IMM GRANULOCYTES NFR BLD AUTO: 0 % (ref 0–0.5)
LYMPHOCYTES # BLD: 2.2 K/UL (ref 0.9–3.6)
LYMPHOCYTES NFR BLD: 41 % (ref 21–52)
MCH RBC QN AUTO: 29.6 PG (ref 24–34)
MCHC RBC AUTO-ENTMCNC: 34.3 G/DL (ref 31–37)
MCV RBC AUTO: 86.1 FL (ref 78–100)
MONOCYTES # BLD: 0.3 K/UL (ref 0.05–1.2)
MONOCYTES NFR BLD: 6 % (ref 3–10)
NEUTS SEG # BLD: 2.8 K/UL (ref 1.8–8)
NEUTS SEG NFR BLD: 52 % (ref 40–73)
NRBC # BLD: 0 K/UL (ref 0–0.01)
NRBC BLD-RTO: 0 PER 100 WBC
P-R INTERVAL, ECG05: 150 MS
PLATELET # BLD AUTO: 274 K/UL (ref 135–420)
PMV BLD AUTO: 10.2 FL (ref 9.2–11.8)
POTASSIUM SERPL-SCNC: 4.1 MMOL/L (ref 3.5–5.5)
Q-T INTERVAL, ECG07: 410 MS
QRS DURATION, ECG06: 76 MS
QTC CALCULATION (BEZET), ECG08: 395 MS
RBC # BLD AUTO: 4.33 M/UL (ref 4.2–5.3)
SODIUM SERPL-SCNC: 140 MMOL/L (ref 136–145)
VENTRICULAR RATE, ECG03: 56 BPM
WBC # BLD AUTO: 5.4 K/UL (ref 4.6–13.2)

## 2022-10-19 PROCEDURE — 85025 COMPLETE CBC W/AUTO DIFF WBC: CPT

## 2022-10-19 PROCEDURE — 80048 BASIC METABOLIC PNL TOTAL CA: CPT

## 2022-10-19 PROCEDURE — 93005 ELECTROCARDIOGRAM TRACING: CPT

## 2022-10-19 PROCEDURE — 36415 COLL VENOUS BLD VENIPUNCTURE: CPT

## 2022-10-31 ENCOUNTER — ANESTHESIA EVENT (OUTPATIENT)
Dept: SURGERY | Age: 67
End: 2022-10-31
Payer: MEDICARE

## 2022-10-31 ENCOUNTER — HOSPITAL ENCOUNTER (OUTPATIENT)
Dept: PREADMISSION TESTING | Age: 67
Discharge: HOME | End: 2022-10-31

## 2022-10-31 VITALS — HEIGHT: 64 IN | WEIGHT: 194 LBS | BODY MASS INDEX: 33.12 KG/M2

## 2022-10-31 RX ORDER — INSULIN GLARGINE 100 [IU]/ML
10 INJECTION, SOLUTION SUBCUTANEOUS
COMMUNITY

## 2022-10-31 RX ORDER — LANOLIN ALCOHOL/MO/W.PET/CERES
400 CREAM (GRAM) TOPICAL DAILY
COMMUNITY

## 2022-10-31 RX ORDER — SODIUM CHLORIDE, SODIUM LACTATE, POTASSIUM CHLORIDE, CALCIUM CHLORIDE 600; 310; 30; 20 MG/100ML; MG/100ML; MG/100ML; MG/100ML
125 INJECTION, SOLUTION INTRAVENOUS CONTINUOUS
Status: CANCELLED | OUTPATIENT
Start: 2022-10-31

## 2022-10-31 RX ORDER — BISMUTH SUBSALICYLATE 262 MG
1 TABLET,CHEWABLE ORAL DAILY
COMMUNITY

## 2022-10-31 RX ORDER — CEFAZOLIN SODIUM/WATER 2 G/20 ML
2 SYRINGE (ML) INTRAVENOUS ONCE
Status: CANCELLED | OUTPATIENT
Start: 2022-10-31 | End: 2022-10-31

## 2022-10-31 RX ORDER — METFORMIN HYDROCHLORIDE 1000 MG/1
1000 TABLET ORAL 2 TIMES DAILY WITH MEALS
COMMUNITY

## 2022-10-31 NOTE — PERIOP NOTES
PAT - SURGICAL PRE-ADMISSION INSTRUCTIONS    NAME:  Nate Irene                                                          TODAY'S DATE:  10/31/2022    SURGERY DATE:  11/4/2022                                  SURGERY ARRIVAL TIME:   tba    Do NOT eat or drink anything, including candy or gum, after MIDNIGHT on 11/3/22 , unless you have specific instructions from your Surgeon or Anesthesia Provider to do so. No smoking 24 hours before surgery. No alcohol 24 hours prior to the day of surgery. No recreational drugs for one week prior to the day of surgery. Leave all valuables, including money/purse, at home. Remove all jewelry, nail polish, makeup (including mascara); no lotions, powders, deodorant, or perfume/cologne/after shave. Glasses/Contact lenses and Dentures may be worn to the hospital.  They will be removed prior to surgery. Call your doctor if symptoms of a cold or illness develop within 24 ours prior to surgery. AN ADULT MUST DRIVE YOU HOME AFTER OUTPATIENT SURGERY. If you are having an OUTPATIENT procedure, please make arrangements for a responsible adult to be with you for 24 hours after your surgery. If you are admitted to the hospital, you will be assigned to a bed after surgery is complete. Normally a family member will not be able to see you until you are in your assigned bed. 12. Visitation Restrictions Explained. Special Instructions:    HOLD oral diabetic medication on the MORNING OF surgery. , HOLD metformin/glucophage dose starting the EVENING BEFORE the day of surgery. Take only 1/2 dose of Lantus on night before surgery  Patient Prep:    use CHG solution     These surgical instructions were reviewed with Patient during the PAT Phone Call.

## 2022-11-04 ENCOUNTER — ANESTHESIA (OUTPATIENT)
Dept: SURGERY | Age: 67
End: 2022-11-04
Payer: MEDICARE

## 2022-11-04 ENCOUNTER — HOSPITAL ENCOUNTER (OUTPATIENT)
Age: 67
Setting detail: OUTPATIENT SURGERY
Discharge: HOME | End: 2022-11-04
Attending: PLASTIC SURGERY | Admitting: PLASTIC SURGERY
Payer: MEDICARE

## 2022-11-04 VITALS
RESPIRATION RATE: 14 BRPM | TEMPERATURE: 97.3 F | SYSTOLIC BLOOD PRESSURE: 161 MMHG | HEIGHT: 64 IN | DIASTOLIC BLOOD PRESSURE: 62 MMHG | HEART RATE: 68 BPM | OXYGEN SATURATION: 96 % | WEIGHT: 197 LBS | BODY MASS INDEX: 33.63 KG/M2

## 2022-11-04 LAB
GLUCOSE BLD STRIP.AUTO-MCNC: 122 MG/DL (ref 70–110)
GLUCOSE BLD STRIP.AUTO-MCNC: 133 MG/DL (ref 70–110)

## 2022-11-04 PROCEDURE — 77030002986 HC SUT PROL J&J -A: Performed by: PLASTIC SURGERY

## 2022-11-04 PROCEDURE — 74011000250 HC RX REV CODE- 250: Performed by: PLASTIC SURGERY

## 2022-11-04 PROCEDURE — 76210000006 HC OR PH I REC 0.5 TO 1 HR: Performed by: PLASTIC SURGERY

## 2022-11-04 PROCEDURE — 77030002916 HC SUT ETHLN J&J -A: Performed by: PLASTIC SURGERY

## 2022-11-04 PROCEDURE — 77030013358: Performed by: PLASTIC SURGERY

## 2022-11-04 PROCEDURE — 77030011264 HC ELECTRD BLD EXT COVD -A: Performed by: PLASTIC SURGERY

## 2022-11-04 PROCEDURE — 77010033678 HC OXYGEN DAILY

## 2022-11-04 PROCEDURE — 76010000133 HC OR TIME 3 TO 3.5 HR: Performed by: PLASTIC SURGERY

## 2022-11-04 PROCEDURE — 77030008556 HC TBNG SMK EVAC COVD -A: Performed by: PLASTIC SURGERY

## 2022-11-04 PROCEDURE — C9290 INJ, BUPIVACAINE LIPOSOME: HCPCS | Performed by: PLASTIC SURGERY

## 2022-11-04 PROCEDURE — 77030040504 HC DRN WND MDII -B: Performed by: PLASTIC SURGERY

## 2022-11-04 PROCEDURE — 77030040361 HC SLV COMPR DVT MDII -B: Performed by: PLASTIC SURGERY

## 2022-11-04 PROCEDURE — 74011000258 HC RX REV CODE- 258: Performed by: PLASTIC SURGERY

## 2022-11-04 PROCEDURE — 77030002933 HC SUT MCRYL J&J -A: Performed by: PLASTIC SURGERY

## 2022-11-04 PROCEDURE — 88305 TISSUE EXAM BY PATHOLOGIST: CPT

## 2022-11-04 PROCEDURE — 2709999900 HC NON-CHARGEABLE SUPPLY: Performed by: PLASTIC SURGERY

## 2022-11-04 PROCEDURE — 76060000037 HC ANESTHESIA 3 TO 3.5 HR: Performed by: PLASTIC SURGERY

## 2022-11-04 PROCEDURE — 74011250636 HC RX REV CODE- 250/636: Performed by: NURSE ANESTHETIST, CERTIFIED REGISTERED

## 2022-11-04 PROCEDURE — 74011250637 HC RX REV CODE- 250/637: Performed by: ANESTHESIOLOGY

## 2022-11-04 PROCEDURE — 74011250636 HC RX REV CODE- 250/636: Performed by: ANESTHESIOLOGY

## 2022-11-04 PROCEDURE — 77030020782 HC GWN BAIR PAWS FLX 3M -B: Performed by: PLASTIC SURGERY

## 2022-11-04 PROCEDURE — 77030012508 HC MSK AIRWY LMA AMBU -A: Performed by: ANESTHESIOLOGY

## 2022-11-04 PROCEDURE — 77030008462 HC STPLR SKN PROX J&J -A: Performed by: PLASTIC SURGERY

## 2022-11-04 PROCEDURE — 77030010507 HC ADH SKN DERMBND J&J -B: Performed by: PLASTIC SURGERY

## 2022-11-04 PROCEDURE — 76210000026 HC REC RM PH II 1 TO 1.5 HR: Performed by: PLASTIC SURGERY

## 2022-11-04 PROCEDURE — 74011000250 HC RX REV CODE- 250: Performed by: NURSE ANESTHETIST, CERTIFIED REGISTERED

## 2022-11-04 PROCEDURE — 74011250636 HC RX REV CODE- 250/636: Performed by: PLASTIC SURGERY

## 2022-11-04 PROCEDURE — 82962 GLUCOSE BLOOD TEST: CPT

## 2022-11-04 RX ORDER — EPHEDRINE SULFATE/0.9% NACL/PF 50 MG/5 ML
SYRINGE (ML) INTRAVENOUS AS NEEDED
Status: DISCONTINUED | OUTPATIENT
Start: 2022-11-04 | End: 2022-11-04 | Stop reason: HOSPADM

## 2022-11-04 RX ORDER — FENTANYL CITRATE 50 UG/ML
INJECTION, SOLUTION INTRAMUSCULAR; INTRAVENOUS AS NEEDED
Status: DISCONTINUED | OUTPATIENT
Start: 2022-11-04 | End: 2022-11-04 | Stop reason: HOSPADM

## 2022-11-04 RX ORDER — MAGNESIUM SULFATE 100 %
4 CRYSTALS MISCELLANEOUS AS NEEDED
Status: DISCONTINUED | OUTPATIENT
Start: 2022-11-04 | End: 2022-11-04 | Stop reason: HOSPADM

## 2022-11-04 RX ORDER — KETAMINE HYDROCHLORIDE 10 MG/ML
INJECTION, SOLUTION INTRAMUSCULAR; INTRAVENOUS AS NEEDED
Status: DISCONTINUED | OUTPATIENT
Start: 2022-11-04 | End: 2022-11-04 | Stop reason: HOSPADM

## 2022-11-04 RX ORDER — NALOXONE HYDROCHLORIDE 0.4 MG/ML
0.1 INJECTION, SOLUTION INTRAMUSCULAR; INTRAVENOUS; SUBCUTANEOUS
Status: DISCONTINUED | OUTPATIENT
Start: 2022-11-04 | End: 2022-11-04 | Stop reason: HOSPADM

## 2022-11-04 RX ORDER — MIDAZOLAM HYDROCHLORIDE 1 MG/ML
INJECTION, SOLUTION INTRAMUSCULAR; INTRAVENOUS AS NEEDED
Status: DISCONTINUED | OUTPATIENT
Start: 2022-11-04 | End: 2022-11-04 | Stop reason: HOSPADM

## 2022-11-04 RX ORDER — FENTANYL CITRATE 50 UG/ML
25 INJECTION, SOLUTION INTRAMUSCULAR; INTRAVENOUS
Status: DISCONTINUED | OUTPATIENT
Start: 2022-11-04 | End: 2022-11-04 | Stop reason: HOSPADM

## 2022-11-04 RX ORDER — OXYCODONE AND ACETAMINOPHEN 5; 325 MG/1; MG/1
1 TABLET ORAL AS NEEDED
Status: DISCONTINUED | OUTPATIENT
Start: 2022-11-04 | End: 2022-11-04 | Stop reason: HOSPADM

## 2022-11-04 RX ORDER — SODIUM CHLORIDE, SODIUM LACTATE, POTASSIUM CHLORIDE, CALCIUM CHLORIDE 600; 310; 30; 20 MG/100ML; MG/100ML; MG/100ML; MG/100ML
125 INJECTION, SOLUTION INTRAVENOUS CONTINUOUS
Status: DISCONTINUED | OUTPATIENT
Start: 2022-11-04 | End: 2022-11-04 | Stop reason: HOSPADM

## 2022-11-04 RX ORDER — GLYCOPYRROLATE 0.2 MG/ML
INJECTION INTRAMUSCULAR; INTRAVENOUS AS NEEDED
Status: DISCONTINUED | OUTPATIENT
Start: 2022-11-04 | End: 2022-11-04 | Stop reason: HOSPADM

## 2022-11-04 RX ORDER — CEFAZOLIN SODIUM/WATER 2 G/20 ML
2 SYRINGE (ML) INTRAVENOUS ONCE
Status: COMPLETED | OUTPATIENT
Start: 2022-11-04 | End: 2022-11-04

## 2022-11-04 RX ORDER — ONDANSETRON 2 MG/ML
INJECTION INTRAMUSCULAR; INTRAVENOUS AS NEEDED
Status: DISCONTINUED | OUTPATIENT
Start: 2022-11-04 | End: 2022-11-04 | Stop reason: HOSPADM

## 2022-11-04 RX ORDER — PROPOFOL 10 MG/ML
INJECTION, EMULSION INTRAVENOUS AS NEEDED
Status: DISCONTINUED | OUTPATIENT
Start: 2022-11-04 | End: 2022-11-04 | Stop reason: HOSPADM

## 2022-11-04 RX ORDER — ONDANSETRON 2 MG/ML
4 INJECTION INTRAMUSCULAR; INTRAVENOUS ONCE
Status: DISCONTINUED | OUTPATIENT
Start: 2022-11-04 | End: 2022-11-04 | Stop reason: HOSPADM

## 2022-11-04 RX ORDER — HYDROMORPHONE HYDROCHLORIDE 2 MG/ML
INJECTION, SOLUTION INTRAMUSCULAR; INTRAVENOUS; SUBCUTANEOUS AS NEEDED
Status: DISCONTINUED | OUTPATIENT
Start: 2022-11-04 | End: 2022-11-04 | Stop reason: HOSPADM

## 2022-11-04 RX ORDER — SODIUM CHLORIDE, SODIUM LACTATE, POTASSIUM CHLORIDE, CALCIUM CHLORIDE 600; 310; 30; 20 MG/100ML; MG/100ML; MG/100ML; MG/100ML
50 INJECTION, SOLUTION INTRAVENOUS CONTINUOUS
Status: DISCONTINUED | OUTPATIENT
Start: 2022-11-04 | End: 2022-11-04 | Stop reason: HOSPADM

## 2022-11-04 RX ORDER — INSULIN LISPRO 100 [IU]/ML
INJECTION, SOLUTION INTRAVENOUS; SUBCUTANEOUS ONCE
Status: DISCONTINUED | OUTPATIENT
Start: 2022-11-04 | End: 2022-11-04 | Stop reason: HOSPADM

## 2022-11-04 RX ORDER — LIDOCAINE HYDROCHLORIDE 20 MG/ML
INJECTION, SOLUTION EPIDURAL; INFILTRATION; INTRACAUDAL; PERINEURAL AS NEEDED
Status: DISCONTINUED | OUTPATIENT
Start: 2022-11-04 | End: 2022-11-04 | Stop reason: HOSPADM

## 2022-11-04 RX ORDER — HYDROMORPHONE HYDROCHLORIDE 1 MG/ML
0.5 INJECTION, SOLUTION INTRAMUSCULAR; INTRAVENOUS; SUBCUTANEOUS
Status: DISCONTINUED | OUTPATIENT
Start: 2022-11-04 | End: 2022-11-04 | Stop reason: HOSPADM

## 2022-11-04 RX ADMIN — FENTANYL CITRATE 50 MCG: 50 INJECTION, SOLUTION INTRAMUSCULAR; INTRAVENOUS at 11:26

## 2022-11-04 RX ADMIN — KETAMINE HYDROCHLORIDE 30 MG: 10 INJECTION, SOLUTION INTRAMUSCULAR; INTRAVENOUS at 11:01

## 2022-11-04 RX ADMIN — ONDANSETRON HYDROCHLORIDE 4 MG: 2 INJECTION INTRAMUSCULAR; INTRAVENOUS at 10:52

## 2022-11-04 RX ADMIN — HYDROMORPHONE HYDROCHLORIDE 0.5 MG: 2 INJECTION, SOLUTION INTRAMUSCULAR; INTRAVENOUS; SUBCUTANEOUS at 12:24

## 2022-11-04 RX ADMIN — MIDAZOLAM 2 MG: 1 INJECTION INTRAMUSCULAR; INTRAVENOUS at 10:49

## 2022-11-04 RX ADMIN — KETAMINE HYDROCHLORIDE 20 MG: 10 INJECTION, SOLUTION INTRAMUSCULAR; INTRAVENOUS at 11:37

## 2022-11-04 RX ADMIN — LIDOCAINE HYDROCHLORIDE 60 MG: 20 INJECTION, SOLUTION EPIDURAL; INFILTRATION; INTRACAUDAL; PERINEURAL at 10:57

## 2022-11-04 RX ADMIN — HYDROMORPHONE HYDROCHLORIDE 0.5 MG: 1 INJECTION, SOLUTION INTRAMUSCULAR; INTRAVENOUS; SUBCUTANEOUS at 14:27

## 2022-11-04 RX ADMIN — OXYCODONE HYDROCHLORIDE AND ACETAMINOPHEN 1 TABLET: 5; 325 TABLET ORAL at 15:09

## 2022-11-04 RX ADMIN — SODIUM CHLORIDE, SODIUM LACTATE, POTASSIUM CHLORIDE, AND CALCIUM CHLORIDE: 600; 310; 30; 20 INJECTION, SOLUTION INTRAVENOUS at 11:39

## 2022-11-04 RX ADMIN — SODIUM CHLORIDE, SODIUM LACTATE, POTASSIUM CHLORIDE, AND CALCIUM CHLORIDE 125 ML/HR: 600; 310; 30; 20 INJECTION, SOLUTION INTRAVENOUS at 09:31

## 2022-11-04 RX ADMIN — Medication 5 MG: at 13:27

## 2022-11-04 RX ADMIN — GLYCOPYRROLATE 0.2 MG: 0.2 INJECTION INTRAMUSCULAR; INTRAVENOUS at 10:52

## 2022-11-04 RX ADMIN — HYDROMORPHONE HYDROCHLORIDE 0.5 MG: 2 INJECTION, SOLUTION INTRAMUSCULAR; INTRAVENOUS; SUBCUTANEOUS at 12:41

## 2022-11-04 RX ADMIN — Medication 10 MG: at 11:07

## 2022-11-04 RX ADMIN — Medication 2 G: at 11:01

## 2022-11-04 RX ADMIN — HYDROMORPHONE HYDROCHLORIDE 0.5 MG: 1 INJECTION, SOLUTION INTRAMUSCULAR; INTRAVENOUS; SUBCUTANEOUS at 14:37

## 2022-11-04 RX ADMIN — FENTANYL CITRATE 50 MCG: 50 INJECTION, SOLUTION INTRAMUSCULAR; INTRAVENOUS at 10:57

## 2022-11-04 RX ADMIN — PROPOFOL 150 MG: 10 INJECTION, EMULSION INTRAVENOUS at 10:57

## 2022-11-04 NOTE — ANESTHESIA PREPROCEDURE EVALUATION
Relevant Problems   No relevant active problems       Anesthetic History   No history of anesthetic complications            Review of Systems / Medical History  Patient summary reviewed, nursing notes reviewed and pertinent labs reviewed    Pulmonary  Within defined limits                 Neuro/Psych   Within defined limits           Cardiovascular    Hypertension                   GI/Hepatic/Renal     GERD: well controlled      Hiatal hernia     Endo/Other    Diabetes    Arthritis     Other Findings              Physical Exam    Airway  Mallampati: II  TM Distance: 4 - 6 cm  Neck ROM: normal range of motion   Mouth opening: Normal     Cardiovascular  Regular rate and rhythm,  S1 and S2 normal,  no murmur, click, rub, or gallop             Dental  No notable dental hx       Pulmonary  Breath sounds clear to auscultation               Abdominal  GI exam deferred       Other Findings            Anesthetic Plan    ASA: 3  Anesthesia type: general          Induction: Intravenous  Anesthetic plan and risks discussed with: Patient

## 2022-11-04 NOTE — PERIOP NOTES
Patient and family given discharge instructions    AVS med list reviewed, no duplicates noted. D/C instructions reviewed, opportunity for questions.     Reminded patient to take her lisinopril tonight

## 2022-11-04 NOTE — PERIOP NOTES
TRANSFER - OUT REPORT:    Verbal report given to Jose Roberto SHETH RN(name) on [place]  being transferred to phase 2(unit) for routine post - op       Report consisted of patients Situation, Background, Assessment and   Recommendations(SBAR). Information from the following report(s) SBAR, OR Summary, Procedure Summary, Intake/Output, Recent Results, and Med Rec Status was reviewed with the receiving nurse. Lines:   Peripheral IV 20/72/75 Right Cephalic (Active)   Site Assessment Clean, dry, & intact 11/04/22 1411   Phlebitis Assessment 0 11/04/22 1411   Infiltration Assessment 0 11/04/22 1411   Dressing Status Clean, dry, & intact 11/04/22 1411   Dressing Type Tape;Transparent 11/04/22 1411   Hub Color/Line Status Infusing 11/04/22 1411        Opportunity for questions and clarification was provided.       Patient transported with:   Registered Nurse

## 2022-11-04 NOTE — INTERVAL H&P NOTE
Update History & Physical    The Patient's History and Physical of 11/4/2022 was reviewed with the patient and I examined the patient. There was no change. The surgical site was confirmed by the patient and me. Plan:  The risk, benefits, expected outcome, and alternative to the recommended procedure have been discussed with the patient. Patient understands and wants to proceed with the procedure.     Electronically signed by Reola Councilman, MD on 11/4/2022 at 10:17 AM

## 2022-11-04 NOTE — PERIOP NOTES
TRANSFER - IN REPORT:    Verbal report received from Destinee Young on Elisa Munoz  being received from PACU for routine post - op      Report consisted of patients Situation, Background, Assessment and   Recommendations(SBAR). Information from the following report(s) SBAR, Kardex, OR Summary, and MAR was reviewed with the receiving nurse. Opportunity for questions and clarification was provided. Assessment completed upon patients arrival to unit and care assumed.

## 2022-11-04 NOTE — PERIOP NOTES
Reviewed PTA medication list with patient/caregiver and patient/caregiver denies any additional medications. Patient admits to having a responsible adult care for them at home for at least 24 hours after surgery. Patient encouraged to use gown warming system and informed that using said warming gown to regulate body temperature prior to a procedure has been shown to help reduce the risks of blood clots and infection. Patient's pharmacy of choice verified and documented in PTA medication section. Dual skin assessment & fall risk band verification completed with Mainor Santiago.

## 2022-11-04 NOTE — PERIOP NOTES
Tripp Bolds informed via phone of pt had not stopped her ASA and last dose was yesterday. No new  orders received.

## 2022-11-04 NOTE — DISCHARGE INSTRUCTIONS
Patient to call my office on Monday at [phone] to confirm her follow up appointment with me in approximately 1 week. Regular diet. Ambulate multiple times daily. Ok to shower starting tomorrow. Replace gauze as necessary if there is any drainage. Soft bra or post op bra at all times except when showering. Full range of motion of the arms is OK, just no lifting >10lbs or sleeping on your stomach. DISCHARGE SUMMARY from Nurse    PATIENT INSTRUCTIONS:    After general anesthesia or intravenous sedation, for 24 hours or while taking prescription Narcotics:  Limit your activities  Do not drive and operate hazardous machinery  Do not make important personal or business decisions  Do  not drink alcoholic beverages  If you have not urinated within 8 hours after discharge, please contact your surgeon on call. Report the following to your surgeon:  Excessive pain, swelling, redness or odor of or around the surgical area  Temperature over 100.5  Nausea and vomiting lasting longer than 4 hours or if unable to take medications  Any signs of decreased circulation or nerve impairment to extremity: change in color, persistent  numbness, tingling, coldness or increase pain  Any questions    What to do at Home:  Recommended activity: Activity as tolerated and no driving for today,     If you experience any of the following symptoms as noted above, please follow up with Dr. Stuart Geller. *  Please give a list of your current medications to your Primary Care Provider. *  Please update this list whenever your medications are discontinued, doses are      changed, or new medications (including over-the-counter products) are added. *  Please carry medication information at all times in case of emergency situations.     These are general instructions for a healthy lifestyle:    No smoking/ No tobacco products/ Avoid exposure to second hand smoke  Surgeon General's Warning:  Quitting smoking now greatly reduces serious risk to your health. Obesity, smoking, and sedentary lifestyle greatly increases your risk for illness    A healthy diet, regular physical exercise & weight monitoring are important for maintaining a healthy lifestyle    You may be retaining fluid if you have a history of heart failure or if you experience any of the following symptoms:  Weight gain of 3 pounds or more overnight or 5 pounds in a week, increased swelling in our hands or feet or shortness of breath while lying flat in bed. Please call your doctor as soon as you notice any of these symptoms; do not wait until your next office visit. The discharge information has been reviewed with the patient and caregiver. The patient and caregiver verbalized understanding. Discharge medications reviewed with the patient and caregiver and appropriate educational materials and side effects teaching were provided.   ___________________________________________________________________________________________________________________________________

## 2022-11-04 NOTE — PERIOP NOTES
TRANSFER - IN REPORT:    Verbal report received from OR nurse and CRNA(name) on Grecia Bigger  being received from OR(unit) for routine post - op      Report consisted of patients Situation, Background, Assessment and   Recommendations(SBAR). Information from the following report(s) SBAR, OR Summary, Procedure Summary, Intake/Output, MAR, Recent Results, and Med Rec Status was reviewed with the receiving nurse. Opportunity for questions and clarification was provided. Assessment completed upon patients arrival to unit and care assumed.

## 2022-11-04 NOTE — ANESTHESIA POSTPROCEDURE EVALUATION
Post-Anesthesia Evaluation and Assessment    Cardiovascular Function/Vital Signs  Visit Vitals  BP (!) 166/77   Pulse 64   Temp 36.3 °C (97.3 °F)   Resp 17   Ht 5' 4\" (1.626 m)   Wt 89.4 kg (197 lb)   SpO2 97%   BMI 33.81 kg/m²       Patient is status post Procedure(s):  BILATERAL BREAST REDUCTION. Nausea/Vomiting: Controlled. Postoperative hydration reviewed and adequate. Pain:  Pain Scale 1: Numeric (0 - 10) (11/04/22 1443)  Pain Intensity 1: 3 (11/04/22 1443)   Managed. Neurological Status:   Neuro (WDL): Exceptions to WDL (11/04/22 1411)   At baseline. Mental Status and Level of Consciousness: Baseline and stable. Pulmonary Status:   O2 Device: None (Room air) (11/04/22 1440)   Adequate oxygenation and airway patent. Complications related to anesthesia: None    Post-anesthesia assessment completed. No concerns. Patient has met all discharge requirements.     Signed By: Margherita Schaumann, MD

## 2022-11-04 NOTE — BRIEF OP NOTE
Brief Postoperative Note    Patient: Jenetta Lennox  YOB: 1955  MRN: [id number]    Date of Procedure: 11/4/2022     Pre-Op Diagnosis: BILATERAL BREAST HYPERTROPHY    Post-Op Diagnosis: Same as preoperative diagnosis.       Procedure(s):  BILATERAL REDUCTION MAMMOPLASTY    Surgeon(s):  Erik Watts MD    Surgical Assistant: Surg Asst-1: Karin FIGUEREDO    Anesthesia: General     Estimated Blood Loss (mL): less than 971     Complications: None    Specimens:   ID Type Source Tests Collected by Time Destination   1 : Right breast Preservative Breast  Erik Watts MD 11/4/2022 1256 Pathology   2 : Left breast Preservative Breast  Erik Watts MD 11/4/2022 1257 Pathology        Implants: * No implants in log *    Drains: * No LDAs found *    Findings: N/A    Electronically Signed by Mirna Waters MD on 11/4/2022 at 1:48 PM

## 2022-11-05 NOTE — OP NOTES
[facility]  OPERATIVE REPORT    Name:  Aroldo Brooks  MR#:   [id number]  :  1955  ACCOUNT #:  [de-identified]  DATE OF SERVICE:  2022    PREOPERATIVE DIAGNOSIS:  Bilateral breast hypertrophy. POSTOPERATIVE DIAGNOSIS:  Bilateral breast hypertrophy. PROCEDURE PERFORMED:  Bilateral reduction mammoplasty. SURGEON:  Nakia Albarran MD.    ASSISTANT:  Ms. Ivette John. ANESTHESIA:  General endotracheal anesthesia. COMPLICATIONS:  None. SPECIMENS REMOVED:  Right and left breast skin and soft tissue. IMPLANTS:  Per the operative report. ESTIMATED BLOOD LOSS:  75 mL. INTRAVENOUS FLUIDS:  Total of 1800 mL of crystalloid. URINE OUTPUT:  None. DRAINS:  None. INDICATIONS:  The patient is a 48-year-old female, who presented to my practice with longstanding complaints of a very large breast as well as associated worsening neck and back pain. Despite conservative treatment, the patient has had worsening of her symptoms. She now wishes to undergo a surgical breast reduction. Following a thorough examination and discussion of the available treatment options, the patient wished to proceed with bilateral reduction mammoplasty, specifically utilizing a Wise pattern skin excision in conjunction with an inferior pedicle. Not only were the technical aspects of the planned surgery extensively reviewed, so were the potential risks, outlining specifically the risks of bleeding, infection, the resultant scars, potential need for additional surgery, wound-healing difficulties including hematoma, seroma formation, wound dehiscence, postoperative asymmetry and poor cosmetic result, alteration or loss of nipple sensation, difficulty with her inability to breastfeed, DVT, PE, myocardial infarction, and even death. Understanding these limitations, the patient wished to proceed. Her informed consent was obtained and placed in the chart.     PROCEDURE:  On 2022, following proper identification of the patient in the preoperative holding area, the patient's planned procedure and operative sites were confirmed and marked. Following confirmation of final markings, the patient was administered 2 g of Ancef IV antibiotic and brought via hospital stretcher to operating room #4. The patient was transferred from the stretcher and placed in a supine position on the operating room table. Sequential compression devices were attached to the bilateral lower extremities. The patient's arms were placed on padded armboards and secured. A forced warm air body warmer applied over the lower aspect of the patient, and the patient secured to the table with a safety strap. At this point, the patient received her anesthetic. Following adequate induction of general endotracheal anesthesia, the patient's chest was prepped and draped in a standard sterile fashion. A surgical time-out was then performed in which the patient's identification, planned procedure, and operative sites were confirmed. To begin with, a 42-mm cookie cutter was used to rogelio the new border around the right and left areola. Starting first on the right-hand side, a #15 scalpel blade was used to make incision around the new areolar border and the inferior pedicle was then de-epithelialized. Just prior to initiation of the de-epithelialization, 500 mL of standard tumescent fluid was infiltrated into the soft tissue of the right breast as well as the left breast to assist with postoperative anesthesia as well as reduction in blood loss. Once the inferior pedicle on the right-hand side was completely de-epithelialized, the remainder of the Wise pattern incisions were incised with a #10 scalpel blade. Extensive use of electrocautery was applied to divide the deep dermis and subcutaneous tissue elevating the superior, medial, and lateral skin and soft tissue flaps from the underlying tissue.   This dissection was carried down to the chest wall just superficial to the pectoralis major fascia and then proximally up to the level of the clavicle. Once the flaps were fully developed, the excess skin and soft tissue surrounding the inferior pedicle was then excised also with electrocautery. Once an acceptable contour was achieved, further resection was ceased. At this point, closure was begun with a temporary 0 Prolene suture at the trifurcation point. This was then followed by multiple interrupted 3-0 Monocryl suture in the dermis along the inframammary fold portion of the closure as well as the vertical.  Once this was complete, running intracuticular 3-0 Monocryl was then used for reapproximation of the superficial skin along the inframammary fold portion of the closure. With this now being complete on the right-hand side, the exact same process was carried out on the left-hand side. Once complete, the planned inset site for the nipple and areola was marked on the right and left chest.  This was again done with a 42-mm cookie cutter. A #15 scalpel blade was used to de-epithelialize the area and then the underlying dermis and subcutaneous tissue was resected with electrocautery. All of the resected tissue from the right and left breast was sent as specimen and labeled appropriately. The total intraoperative weight of the tissue removed from the right breast was 1308 g and on the left-hand side, 1564 g. At this point, the underlying nipple and areola were brought up through the inset sites and secured utilizing multiple interrupted 3-0 Monocryl suture in the deep dermis. This was then followed by running intracuticular Monocryl around the periphery of the areola and down the vertical portion of the closure pattern. Once this was complete on both the right and left-hand side, the patient was again carefully checked for overall shape and symmetry, as had been done throughout the procedure.   With this being excellent, Exparel liposomal bupivacaine was infiltrated into the soft tissue surrounding the surgical sites. The incisions were then gently cleansed and dried. Dermabond tissue adhesive applied followed by additional dressings and a postoperative bra. At this point, with all instrument and needle counts being correct x2, the patient has been aroused from her anesthetic and extubated. The patient now awaits transport to the recovery room in stable condition. CONDITION:  Stable. DISPOSITION:  The patient has been extubated and awaits transport to the recovery room in stable condition.       MD BROOKS [id number]  D:  11/04/2022 14:01  T:  11/05/2022 0:08  JOB #:  [id number]

## (undated) DEVICE — SUT PROL 0 30IN CTX BLU --

## (undated) DEVICE — SHEET,DRAPE,40X58,STERILE: Brand: MEDLINE

## (undated) DEVICE — DERMABOND SKIN ADH 0.7ML -- DERMABOND ADVANCED 12/BX

## (undated) DEVICE — GARMENT,MEDLINE,DVT,INT,CALF,MED, GEN2: Brand: MEDLINE

## (undated) DEVICE — SPONGE LAP 18X18IN STRL -- 5/PK

## (undated) DEVICE — SUTURE MCRYL + SZ 3-0 L27IN ABSRB UD PS1 L24MM 3/8 CIR REV MCP936H

## (undated) DEVICE — Device

## (undated) DEVICE — TUBING, SUCTION, 1/4" X 12', STRAIGHT: Brand: MEDLINE

## (undated) DEVICE — NEEDLE HYPO 25GA L1.5IN BLU POLYPR HUB S STL REG BVL STR

## (undated) DEVICE — BRA INCONT NURSING XL 44-47 IN

## (undated) DEVICE — DRESSING,GAUZE,XEROFORM,CURAD,1"X8",ST: Brand: CURAD

## (undated) DEVICE — SYR 50ML LR LCK 1ML GRAD NSAF --

## (undated) DEVICE — SUTURE NONABSORBABLE MONOFILAMENT 2-0 FS 18 IN ETHILON 664H

## (undated) DEVICE — SUT MONOCRYL PLUS UD 4-0 --

## (undated) DEVICE — GAUZE PKG STRP IODOFRM 1/4X5YD --

## (undated) DEVICE — DRAPE,CHEST,FENES,15X10,STERIL: Brand: MEDLINE

## (undated) DEVICE — BANDAGE,ELASTIC,ESMARK,STERILE,4"X9',LF: Brand: MEDLINE

## (undated) DEVICE — MINOR: Brand: MEDLINE INDUSTRIES, INC.

## (undated) DEVICE — 3MM SINGLE USE CANNULA, 8MM PORT, 15CM LONG, MERCEDES: Brand: MICROAIRE®

## (undated) DEVICE — STERILE POLYISOPRENE POWDER-FREE SURGICAL GLOVES WITH EMOLLIENT COATING: Brand: PROTEXIS

## (undated) DEVICE — PACK PROCEDURE SURG EXTREMITY CUST

## (undated) DEVICE — DRAIN SURG 15FR SIL RND CHN W/ TRCR FULL FLUT DBL WRP TRAD

## (undated) DEVICE — SWAB CULT SGL AMIES W/O CHAR FOR THRT VAG SKIN HRT CULTSWAB

## (undated) DEVICE — GLOVE ORANGE PI 8   MSG9080

## (undated) DEVICE — BANDAGE COMPR W3INXL5YD BGE POLY COT E RECOVERABLE BRTH W/

## (undated) DEVICE — DERMABOND SKIN ADH 0.7ML --

## (undated) DEVICE — SUTURE MCRYL SZ 3-0 L27IN ABSRB UD L24MM PS-1 3/8 CIR PRIM Y936H

## (undated) DEVICE — NEEDLE HYPO 18GA L1.5IN PNK POLYPR HUB S STL THN WALL FILL

## (undated) DEVICE — INTENDED FOR TISSUE SEPARATION, AND OTHER PROCEDURES THAT REQUIRE A SHARP SURGICAL BLADE TO PUNCTURE OR CUT.: Brand: BARD-PARKER ® CARBON RIB-BACK BLADES

## (undated) DEVICE — INTENDED FOR TISSUE SEPARATION, AND OTHER PROCEDURES THAT REQUIRE A SHARP SURGICAL BLADE TO PUNCTURE OR CUT.: Brand: BARD-PARKER ® DISPOSABLE SCALPELS

## (undated) DEVICE — SPONGE DRAIN NONWOVEN 4X4IN -- 2/PK

## (undated) DEVICE — PREP SKN CHLRAPRP APL 26ML STR --

## (undated) DEVICE — ATTACHMENT SMK EVAC FOR ES PNCL ACCUVAC

## (undated) DEVICE — BLADE ELECTRODE: Brand: EDGE